# Patient Record
Sex: FEMALE | Race: BLACK OR AFRICAN AMERICAN | NOT HISPANIC OR LATINO | Employment: OTHER | URBAN - METROPOLITAN AREA
[De-identification: names, ages, dates, MRNs, and addresses within clinical notes are randomized per-mention and may not be internally consistent; named-entity substitution may affect disease eponyms.]

---

## 2018-08-16 ENCOUNTER — HISTORICAL (OUTPATIENT)
Dept: ADMINISTRATIVE | Facility: HOSPITAL | Age: 83
End: 2018-08-16

## 2020-06-09 ENCOUNTER — HISTORICAL (OUTPATIENT)
Dept: ADMINISTRATIVE | Facility: HOSPITAL | Age: 85
End: 2020-06-09

## 2022-10-10 ENCOUNTER — TELEPHONE (OUTPATIENT)
Dept: INTERNAL MEDICINE | Facility: CLINIC | Age: 87
End: 2022-10-10
Payer: MEDICARE

## 2023-02-06 ENCOUNTER — HOSPITAL ENCOUNTER (EMERGENCY)
Facility: HOSPITAL | Age: 88
Discharge: HOME | End: 2023-02-06
Attending: EMERGENCY MEDICINE
Payer: MEDICARE

## 2023-02-06 VITALS
TEMPERATURE: 97 F | OXYGEN SATURATION: 98 % | RESPIRATION RATE: 16 BRPM | HEIGHT: 64 IN | SYSTOLIC BLOOD PRESSURE: 131 MMHG | WEIGHT: 162.56 LBS | HEART RATE: 84 BPM | BODY MASS INDEX: 27.75 KG/M2 | DIASTOLIC BLOOD PRESSURE: 69 MMHG

## 2023-02-06 DIAGNOSIS — S42.201A CLOSED FRACTURE OF PROXIMAL END OF RIGHT HUMERUS, UNSPECIFIED FRACTURE MORPHOLOGY, INITIAL ENCOUNTER: ICD-10-CM
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
## 2023-02-06 PROCEDURE — 99284 EMERGENCY DEPT VISIT MOD MDM: CPT | Mod: 25

## 2023-02-06 PROCEDURE — 25000003 PHARM REV CODE 250: Performed by: EMERGENCY MEDICINE

## 2023-02-06 RX ORDER — HYDROCODONE BITARTRATE AND ACETAMINOPHEN 10; 325 MG/1; MG/1
1 TABLET ORAL EVERY 6 HOURS PRN
Qty: 20 TABLET | Refills: 0 | Status: SHIPPED | OUTPATIENT
Start: 2023-02-06 | End: 2023-02-11

## 2023-02-06 RX ORDER — HYDROCODONE BITARTRATE AND ACETAMINOPHEN 5; 325 MG/1; MG/1
1 TABLET ORAL
Status: COMPLETED | OUTPATIENT
Start: 2023-02-06 | End: 2023-02-06

## 2023-02-06 RX ADMIN — HYDROCODONE BITARTRATE AND ACETAMINOPHEN 1 TABLET: 5; 325 TABLET ORAL at 09:02

## 2023-02-07 NOTE — ED PROVIDER NOTES
Encounter Date: 2/6/2023       History     Chief Complaint   Patient presents with    Arm Injury     Fell this evening hurting her rt arm.      Mrs. Paula Nicole is a 89 yo female who presents with chief complaint fall. Onset was just prior to arrival when she had missed a step going into the living room and fell landing on her right side, denies having any loss of consciousness. She reports having constant moderate to severe pain in her right upper arm that is aggravated with movement and improved with rest. She also reports having some pain to her right hip that is aggravated with movement and weightbearing, this is mild to moderate in severity. Denies neck pain, back pain, numbness or tingling in extremities, changes in vision or hearing, trouble with speech or swallowing.    The history is provided by the patient.   Review of patient's allergies indicates:  No Known Allergies  Past Medical History:   Diagnosis Date    Unspecified cataract      Past Surgical History:   Procedure Laterality Date    CATARACT EXTRACTION      TONSILLECTOMY       No family history on file.     Review of Systems    Physical Exam     Initial Vitals [02/06/23 2047]   BP Pulse Resp Temp SpO2   (!) 157/69 75 18 96.6 °F (35.9 °C) 99 %      MAP       --         Physical Exam    Nursing note and vitals reviewed.  Constitutional: Vital signs are normal. She appears well-developed and well-nourished.   HENT:   Head: Normocephalic and atraumatic.   Mouth/Throat: Uvula is midline and mucous membranes are normal.   Eyes: EOM are normal. Pupils are equal, round, and reactive to light.   Neck: Trachea normal. Neck supple.   Normal range of motion.  Cardiovascular:  Normal rate, regular rhythm, intact distal pulses and normal pulses.           Pulmonary/Chest: Effort normal and breath sounds normal.   Abdominal: Abdomen is soft. Bowel sounds are normal. There is no rebound and no guarding.   Musculoskeletal:         General: Tenderness present.       Right upper arm: Tenderness and bony tenderness present.      Left upper arm: Normal.      Right elbow: Normal.      Left elbow: Normal.      Right forearm: Normal. No deformity, tenderness or bony tenderness.      Left forearm: Normal.      Right wrist: Normal.      Left wrist: Normal.      Right hand: Normal.      Left hand: Normal.      Cervical back: Normal, normal range of motion and neck supple. No bony tenderness.      Thoracic back: Normal. No bony tenderness.      Lumbar back: Normal. No bony tenderness.      Right hip: Tenderness present.      Left hip: Normal.      Right upper leg: Normal.      Left upper leg: Normal.      Right knee: Normal.      Left knee: Normal.      Right lower leg: Normal.      Left lower leg: Normal.      Right ankle: Normal.      Left ankle: Normal.      Right foot: Normal.      Left foot: Normal.     Neurological: She is alert and oriented to person, place, and time. GCS score is 15. GCS eye subscore is 4. GCS verbal subscore is 5. GCS motor subscore is 6.   Skin: Skin is warm and dry. Capillary refill takes less than 2 seconds.   Psychiatric: She has a normal mood and affect. Her speech is normal. Thought content normal.       ED Course   Procedures  Labs Reviewed - No data to display       Imaging Results              CT Head Without Contrast (Final result)  Result time 02/06/23 21:57:43      Final result by Julian Tran MD (02/06/23 21:57:43)                   Impression:      No acute intracranial abnormality identified.  Findings of chronic microvascular ischemic disease.      Electronically signed by: Julian Tran  Date:    02/06/2023  Time:    21:57               Narrative:    EXAMINATION:  CT HEAD WITHOUT CONTRAST    CLINICAL HISTORY:  Head trauma, minor (Age >= 65y);    TECHNIQUE:  Low dose axial images were obtained through the head.  Coronal and sagittal reformations were also performed. Contrast was not administered.    Automatic exposure control was utilized  to reduce the patient's radiation dose.    DLP= 886    COMPARISON:  None.    FINDINGS:  No acute intracranial hemorrhage, edema or mass. No acute parenchymal abnormality.    Diffuse cerebral atrophy with concordant ventricular enlargement    Scattered hypodensities throughout the deep periventricular white matter.    The osseous structures are normal.    The mastoid air cells are clear.    The auditory canals are patent bilaterally.    The globes and orbital contents are normal bilaterally.    The visualized maxillary, ethmoid and sphenoid sinuses are clear.                                       CT Cervical Spine Without Contrast (Final result)  Result time 02/06/23 21:59:13      Final result by Julian Tran MD (02/06/23 21:59:13)                   Impression:      1. No acute cervical spine abnormality identified.    2. Ligament, spinal cord and/or vascular abnormalities cannot be excluded on the basis of this examination.    Degenerative changes of the cervical spine.  The thyroid is markedly heterogeneous throughout.  Recommend further evaluation with nonemergent ultrasound.      Electronically signed by: Julian Tran  Date:    02/06/2023  Time:    21:59               Narrative:    EXAMINATION:  CT CERVICAL SPINE WITHOUT CONTRAST    CLINICAL HISTORY:  Neck trauma (Age >= 65y);    TECHNIQUE:  CT of the cervical spine Without contrast. Sagittal and coronal reconstructions were performed on the source images.    Automatic exposure control was utilized to reduce the patient's radiation dose.    DLP = 324    COMPARISON:  No prior imaging available for comparison.    FINDINGS:  There is no acute fracture, subluxation, or dislocation. Limited detail regarding cervical discs, but there is no finding seen to suggest acute disc herniation.  Loss of disc space greatest at C5-C6 and C6-C7.  There is mild to moderate bilateral neural foraminal narrowing at these levels.  The lateral masses are symmetric about the dens.  Straightening of the normal lordotic curvature.    The prevertebral soft tissues are normal. There is no lymphadenopathy.  The thyroid is markedly heterogeneous throughout.                                       X-Ray Hip 2 or 3 views Right (with Pelvis when performed) (Final result)  Result time 02/06/23 21:44:26      Final result by Julian Tran MD (02/06/23 21:44:26)                   Impression:      No displaced fracture appreciated.  If continued pain recommend repeat imaging within 2 weeks.  Nondisplaced fracture may remain occult.      Electronically signed by: Julian Tran  Date:    02/06/2023  Time:    21:44               Narrative:    EXAMINATION:  XR HIP WITH PELVIS WHEN PERFORMED, 2 OR 3  VIEWS RIGHT    CLINICAL HISTORY:  fall;    TECHNIQUE:  Single view of the pelvis with two views of the right hip.    COMPARISON:  No prior imaging available for comparison    FINDINGS:  The pubic symphysis is congruent.  The sacroiliac joints are symmetric.  Degenerative changes with bilateral acetabular osteophytes.  No displaced fracture.                                       X-Ray Humerus 2 View Right (Final result)  Result time 02/06/23 21:43:19      Final result by Julian Tran MD (02/06/23 21:43:19)                   Impression:      Comminuted fracture of the proximal humerus with mild impaction.      Electronically signed by: Julian Tran  Date:    02/06/2023  Time:    21:43               Narrative:    EXAMINATION:  XR HUMERUS 2 VIEW RIGHT    CLINICAL HISTORY:  Unspecified fall, initial encounter    TECHNIQUE:  Two views of the right humerus.    COMPARISON:  None    FINDINGS:  Comminuted fracture of the proximal humerus with mild impaction.                                       Medications   HYDROcodone-acetaminophen 5-325 mg per tablet 1 tablet (1 tablet Oral Given 2/6/23 2150)     Medical Decision Making:   Initial Assessment:   89 yo female presents after fall from 2 steps in living room landing  on her right side, denies loss of consciousness. She was placed in sling upon ED arrival. Will get imaging of right humerus and hip to identify any fractures. Due to age will also get CT head and since she has a distracting injury I am unable to clinically clear her cervical spine so will get CT c-spine as well. Will treat pain with norco and reassess.    CT head shows no acute intracranial process.  CT C-spine shows no acute bony abnormality.  X-ray of the right hip shows no evidence of fracture or dislocation.  X-ray of the right humerus shows a comminuted fracture of the proximal humerus with mild impaction.  She is neurovascularly intact distally throughout the arm with full range of motion of wrist and fingers.  All imaging discussed with the patient and family.  Instructed to stay in her sling and follow up outpatient with Orthopedics for which I provided a referral.  I counseled patient on risks associated with opioid medication including, but not limited to, physical dependence and/or addiction, confusion, constipation, drowsiness, nausea or vomiting, impairment in driving and/or operating machinery. I also advised the patient that taking more opioids than prescribed or mixing with other central nervous system depressants/sedatives, such as benzodiazepines or alcohol, can result in respiratory depression, hypoxic brain injury, coma, or death.  I have spoken with the patient and/or caregivers. I have explained the patient's condition, diagnoses and treatment plan based on the information available to me at this time. I have answered the patient's and/or caregiver's questions and addressed any concerns. The patient and/or caregivers have as good an understanding of the patient's diagnosis, condition and treatment plan as can be expected at this point. The vital signs have been stable. The patient's condition is stable and appropriate for discharge from the emergency department.     The patient will pursue  further outpatient evaluation with the primary care physician or other designated or consulting physician as outlined in the discharge instructions. The patient and/or caregivers are agreeable to this plan of care and follow-up instructions have been explained in detail. The patient and/or caregivers have received these instructions in written format and have expressed an understanding of the discharge instructions. The patient and/or caregivers are aware that any significant change in condition or worsening of symptoms should prompt an immediate return to this or the closest emergency department or a call to 911.  Clinical Tests:   Radiological Study: Ordered and Reviewed                        Clinical Impression:   Final diagnoses:  [W19.XXXA] Fall  [W19.XXXA] Fall, initial encounter (Primary)  [S42.201A] Closed fracture of proximal end of right humerus, unspecified fracture morphology, initial encounter        ED Disposition Condition    Discharge Stable          ED Prescriptions       Medication Sig Dispense Start Date End Date Auth. Provider    HYDROcodone-acetaminophen (NORCO)  mg per tablet Take 1 tablet by mouth every 6 (six) hours as needed for Pain. 20 tablet 2/6/2023 2/11/2023 Elmo Rosenthal DO          Follow-up Information    None          Elmo Rosenthal DO  02/07/23 0259

## 2023-02-08 ENCOUNTER — OFFICE VISIT (OUTPATIENT)
Dept: ORTHOPEDICS | Facility: CLINIC | Age: 88
End: 2023-02-08
Payer: MEDICARE

## 2023-02-08 DIAGNOSIS — M25.559 PAIN IN UNSPECIFIED HIP: ICD-10-CM

## 2023-02-08 DIAGNOSIS — S42.201A CLOSED FRACTURE OF PROXIMAL END OF RIGHT HUMERUS, UNSPECIFIED FRACTURE MORPHOLOGY, INITIAL ENCOUNTER: Primary | ICD-10-CM

## 2023-02-08 PROCEDURE — 99203 OFFICE O/P NEW LOW 30 MIN: CPT | Mod: ,,, | Performed by: REHABILITATION UNIT

## 2023-02-08 PROCEDURE — 3288F PR FALLS RISK ASSESSMENT DOCUMENTED: ICD-10-PCS | Mod: CPTII,,, | Performed by: REHABILITATION UNIT

## 2023-02-08 PROCEDURE — 1125F PR PAIN SEVERITY QUANTIFIED, PAIN PRESENT: ICD-10-PCS | Mod: CPTII,,, | Performed by: REHABILITATION UNIT

## 2023-02-08 PROCEDURE — 1125F AMNT PAIN NOTED PAIN PRSNT: CPT | Mod: CPTII,,, | Performed by: REHABILITATION UNIT

## 2023-02-08 PROCEDURE — 1100F PTFALLS ASSESS-DOCD GE2>/YR: CPT | Mod: CPTII,,, | Performed by: REHABILITATION UNIT

## 2023-02-08 PROCEDURE — 1100F PR PT FALLS ASSESS DOC 2+ FALLS/FALL W/INJURY/YR: ICD-10-PCS | Mod: CPTII,,, | Performed by: REHABILITATION UNIT

## 2023-02-08 PROCEDURE — 3288F FALL RISK ASSESSMENT DOCD: CPT | Mod: CPTII,,, | Performed by: REHABILITATION UNIT

## 2023-02-08 PROCEDURE — 99203 PR OFFICE/OUTPT VISIT, NEW, LEVL III, 30-44 MIN: ICD-10-PCS | Mod: ,,, | Performed by: REHABILITATION UNIT

## 2023-02-08 RX ORDER — DOCUSATE SODIUM 100 MG/1
100 CAPSULE, LIQUID FILLED ORAL 2 TIMES DAILY
COMMUNITY

## 2023-02-08 RX ORDER — PNV NO.95/FERROUS FUM/FOLIC AC 28MG-0.8MG
100 TABLET ORAL DAILY
COMMUNITY

## 2023-02-08 RX ORDER — AMOXICILLIN 500 MG
CAPSULE ORAL DAILY
COMMUNITY

## 2023-02-08 RX ORDER — AMLODIPINE BESYLATE 5 MG/1
5 TABLET ORAL
COMMUNITY
Start: 2023-01-24

## 2023-02-08 RX ORDER — ASPIRIN 81 MG/1
81 TABLET ORAL DAILY
COMMUNITY

## 2023-02-08 RX ORDER — IRBESARTAN 300 MG/1
300 TABLET ORAL
COMMUNITY
Start: 2023-01-24

## 2023-02-08 NOTE — PROGRESS NOTES
Subjective:      Patient ID: Paula Nicole is a 89 y.o. female.    Chief Complaint: Pain of the Right Upper Arm and Follow-up (R. HUMERUS FX - WENT OT [facility] - ER. Patient sy=chang she also has pain in her right hip. States the ER doctor told her if it got worse to get a CT scan done. pain meds are not working and makes patient vomit. would like to discuss changing her medication. pain is a 10/10. )    HPI:   Paula Nicole is a 89 y.o. female who presents today for initial evaluation of her right shoulder.  She reports that she tripped on a step and fell on Monday.  She is right-hand dominant.  She did not use any assistive device to help with ambulation prior to this fall.  She went to an outside emergency department where radiographs showed a fracture.  She is placed into a sling and swath.  She has tried some narcotic medicine but does not tolerate this well due to nausea and vomiting.  She has persistent pain about the right upper arm.  No sensory or motor deficits are reported distally.  She did have some shoulder pain prior to this and her baseline function did not have full motion.  Patient also has some right hip pain after the fall.  No preceding pain.  Pain is localized to the groin.  She is in a wheelchair.    Past Medical History:   Diagnosis Date    Unspecified cataract      Past Surgical History:   Procedure Laterality Date    CATARACT EXTRACTION      TONSILLECTOMY       Social History     Socioeconomic History    Marital status:    Tobacco Use    Smoking status: Never    Smokeless tobacco: Never   Substance and Sexual Activity    Alcohol use: Never    Drug use: Never    Sexual activity: Never         Current Outpatient Medications:     amLODIPine (NORVASC) 5 MG tablet, Take 5 mg by mouth., Disp: , Rfl:     aspirin (ECOTRIN) 81 MG EC tablet, Take 81 mg by mouth once daily., Disp: , Rfl:     cyanocobalamin (VITAMIN B-12) 100 MCG tablet, Take 100 mcg by mouth once daily., Disp: , Rfl:     docusate sodium  (COLACE) 100 MG capsule, Take 100 mg by mouth 2 (two) times daily., Disp: , Rfl:     gabapentin (NEURONTIN) 300 MG capsule, Take 300 mg by mouth 2 (two) times a day., Disp: , Rfl:     irbesartan (AVAPRO) 300 MG tablet, Take 300 mg by mouth., Disp: , Rfl:     multivitamin with minerals tablet, Take 1 tablet by mouth once daily., Disp: , Rfl:     omega-3 fatty acids/fish oil (FISH OIL-OMEGA-3 FATTY ACIDS) 300-1,000 mg capsule, Take by mouth once daily., Disp: , Rfl:     HYDROcodone-acetaminophen (NORCO) 7.5-325 mg per tablet, , Disp: , Rfl:   Review of patient's allergies indicates:  No Known Allergies    There were no vitals taken for this visit.    Comprehensive review of systems completed and negative except as per HPI.        Objective:   Head: Normocephalic, without obvious abnormality, atraumatic  Eyes: conjunctivae/corneas clear. EOM's intact  Ears: normal external appearance  Nose: Nares normal. Septum midline. Mucosa normal. No drainage  Throat: normal findings: lips normal without lesions  Lungs: unlabored breathing on room air  Chest wall: symmetric chest rise  Heart: regular rate and rhythm  Pulses: 2+ and symmetric  Skin: Skin color, texture, turgor normal. No rashes or lesions  Neurologic: Grossly normal    right SHOULDER    Appearance:   Moderate soft tissue swelling and some ecchymosis about the upper arm    Cervical Spine:   No ttp; full, painless ROM    Tenderness:   Diffusely about the shoulder    AROM:   Deferred    PROM:  Deferred    Pain:  With movement    Strength:  Deferred    Provocative Maneuvers:     Rotator Cuff/Biceps/AC Joint  Deferred    Pulses: Palpable radial pulse    Neurological deficits: None    The patient has a warm and well-perfused upper extremity with capillary refill less than 2 seconds. Sensation is intact to light touch in terminal nerve distributions. 5/5 ain/pin/uln. The patient has no palpable epitrochlear lymphadenopathy.    Right lower extremity  She is neurovascularly  intact distally.  She does have some tenderness about her hip.  She is been unable to ambulate and is in a wheelchair.  She has pain with range of motion of the hip.  A lot of pain laterally.    Assessment:     Imaging:   Narrative & Impression  EXAMINATION:  XR HUMERUS 2 VIEW RIGHT     CLINICAL HISTORY:  Unspecified fall, initial encounter     TECHNIQUE:  Two views of the right humerus.     COMPARISON:  None     FINDINGS:  Comminuted fracture of the proximal humerus with mild impaction.     Impression:     Comminuted fracture of the proximal humerus with mild impaction.        Electronically signed by: Julian Tran  Date:                                            02/06/2023  Time:                                           21:43    Narrative & Impression  EXAMINATION:  XR HIP WITH PELVIS WHEN PERFORMED, 2 OR 3  VIEWS RIGHT     CLINICAL HISTORY:  fall;     TECHNIQUE:  Single view of the pelvis with two views of the right hip.     COMPARISON:  No prior imaging available for comparison     FINDINGS:  The pubic symphysis is congruent.  The sacroiliac joints are symmetric.  Degenerative changes with bilateral acetabular osteophytes.  No displaced fracture.     Impression:     No displaced fracture appreciated.  If continued pain recommend repeat imaging within 2 weeks.  Nondisplaced fracture may remain occult.        Electronically signed by: Julian Tran  Date:                                            02/06/2023  Time:                                           21:44    Imaging of the hip and humerus reviewed.  Proximal humerus fracture.  No hip abnormalities.        1. Closed fracture of proximal end of right humerus, unspecified fracture morphology, initial encounter    2. Pain in unspecified hip          Plan:       Orders Placed This Encounter    CT Hip Without Contrast Right      Imaging and exam findings discussed with the patient and her family.  With regard to her right shoulder she has a comminuted and  displaced right proximal humerus fracture.  Given her age and functional status we will proceed with nonoperative management.  Her left shoulder function is painless with forward flexion limited to 90°.  She will continue sling immobilization of the upper extremity.  Control.  With regard to her right hip.  No acute abnormality appreciated on radiographs.  We will send her for CT for further evaluation for any bony injury.  I will see her back after the CT is completed to discuss results and treatment plan.  All of her questions were answered and she was in agreement.

## 2023-02-16 ENCOUNTER — HOSPITAL ENCOUNTER (OUTPATIENT)
Dept: RADIOLOGY | Facility: CLINIC | Age: 88
Discharge: HOME | End: 2023-02-16
Attending: PHYSICIAN ASSISTANT
Payer: MEDICARE

## 2023-02-16 ENCOUNTER — OFFICE VISIT (OUTPATIENT)
Dept: ORTHOPEDICS | Facility: CLINIC | Age: 88
End: 2023-02-16
Payer: MEDICARE

## 2023-02-16 VITALS
WEIGHT: 162 LBS | HEIGHT: 64 IN | BODY MASS INDEX: 27.66 KG/M2 | DIASTOLIC BLOOD PRESSURE: 58 MMHG | HEART RATE: 80 BPM | SYSTOLIC BLOOD PRESSURE: 131 MMHG

## 2023-02-16 DIAGNOSIS — S42.291D OTHER CLOSED DISPLACED FRACTURE OF PROXIMAL END OF RIGHT HUMERUS WITH ROUTINE HEALING, SUBSEQUENT ENCOUNTER: ICD-10-CM

## 2023-02-16 DIAGNOSIS — M25.511 RIGHT SHOULDER PAIN, UNSPECIFIED CHRONICITY: ICD-10-CM

## 2023-02-16 DIAGNOSIS — M25.559 PAIN IN UNSPECIFIED HIP: Primary | ICD-10-CM

## 2023-02-16 PROCEDURE — 3288F FALL RISK ASSESSMENT DOCD: CPT | Mod: CPTII,,, | Performed by: PHYSICIAN ASSISTANT

## 2023-02-16 PROCEDURE — 1159F MED LIST DOCD IN RCRD: CPT | Mod: CPTII,,, | Performed by: PHYSICIAN ASSISTANT

## 2023-02-16 PROCEDURE — 1160F RVW MEDS BY RX/DR IN RCRD: CPT | Mod: CPTII,,, | Performed by: PHYSICIAN ASSISTANT

## 2023-02-16 PROCEDURE — 1159F PR MEDICATION LIST DOCUMENTED IN MEDICAL RECORD: ICD-10-PCS | Mod: CPTII,,, | Performed by: PHYSICIAN ASSISTANT

## 2023-02-16 PROCEDURE — 1101F PT FALLS ASSESS-DOCD LE1/YR: CPT | Mod: CPTII,,, | Performed by: PHYSICIAN ASSISTANT

## 2023-02-16 PROCEDURE — 3288F PR FALLS RISK ASSESSMENT DOCUMENTED: ICD-10-PCS | Mod: CPTII,,, | Performed by: PHYSICIAN ASSISTANT

## 2023-02-16 PROCEDURE — 1125F PR PAIN SEVERITY QUANTIFIED, PAIN PRESENT: ICD-10-PCS | Mod: CPTII,,, | Performed by: PHYSICIAN ASSISTANT

## 2023-02-16 PROCEDURE — 99214 OFFICE O/P EST MOD 30 MIN: CPT | Mod: ,,, | Performed by: PHYSICIAN ASSISTANT

## 2023-02-16 PROCEDURE — 99214 PR OFFICE/OUTPT VISIT, EST, LEVL IV, 30-39 MIN: ICD-10-PCS | Mod: ,,, | Performed by: PHYSICIAN ASSISTANT

## 2023-02-16 PROCEDURE — 1160F PR REVIEW ALL MEDS BY PRESCRIBER/CLIN PHARMACIST DOCUMENTED: ICD-10-PCS | Mod: CPTII,,, | Performed by: PHYSICIAN ASSISTANT

## 2023-02-16 PROCEDURE — 73060 X-RAY EXAM OF HUMERUS: CPT | Mod: RT,,, | Performed by: PHYSICIAN ASSISTANT

## 2023-02-16 PROCEDURE — 73060 XR HUMERUS 2 VIEW RIGHT: ICD-10-PCS | Mod: RT,,, | Performed by: PHYSICIAN ASSISTANT

## 2023-02-16 PROCEDURE — 1125F AMNT PAIN NOTED PAIN PRSNT: CPT | Mod: CPTII,,, | Performed by: PHYSICIAN ASSISTANT

## 2023-02-16 PROCEDURE — 1101F PR PT FALLS ASSESS DOC 0-1 FALLS W/OUT INJ PAST YR: ICD-10-PCS | Mod: CPTII,,, | Performed by: PHYSICIAN ASSISTANT

## 2023-02-16 RX ORDER — HYDROCODONE BITARTRATE AND ACETAMINOPHEN 7.5; 325 MG/1; MG/1
TABLET ORAL
COMMUNITY
Start: 2023-02-08

## 2023-02-16 NOTE — PROGRESS NOTES
"Chief Complaint:   Chief Complaint   Patient presents with    Right Hip - Results     CT results rt hip. Pt states she is still having some pain.        History of present illness:    This is a 89 y.o. year old female who complains of follow up for a CT of the pelvis and along with a right proximal humerus fracture.    Patient is up ambulating with assistive device and she states her shoulder is still painful but she has been managing in wearing a sling for comfort    Review of Systems:    Constitution:   Denies chills, fever, and sweats.  HENT:   Denies headaches or blurry vision.  Cardiovascular:  Denies chest pain or irregular heart beat.  Respiratory:   Denies cough or shortness of breath.  Gastrointestinal:  Denies abdominal pain, nausea, or vomiting.  Musculoskeletal:   Denies muscle cramps.  Neurological:   Denies dizziness or focal weakness.  Psychiatric/Behavior: Normal mental status.  Hematology/Lymph:  Denies bleeding problem or easy bruising/bleeding.  Skin:    Denies rash or suspicious lesions.    Examination:    Vital Signs:    Vitals:    02/16/23 0905 02/16/23 0906   BP: (!) 131/58    Pulse: 80    Weight: 73.5 kg (162 lb)    Height: 5' 4" (1.626 m)    PainSc:    9       Body mass index is 27.81 kg/m².    Constitution:   Well-developed, well nourished patient in no acute distress.  Neurological:   Alert and oriented x 3 and cooperative to examination.     Psychiatric/Behavior: Normal mental status.  Respiratory:   No shortness of breath.  Eyes:    Extraoccular muscles intact  Skin:    No scars, rash or suspicious lesions.    Physical Exam:   Right hip exam   Patient has no pain with flexion of the hip.    She has no pain with internal or external rotation with the hip flexed  She is ambulating with a assistive device and a mildly altered gait but this is not different from normal gait pattern for her family.      Right shoulder exam   Patient has tense palpation of the proximal humerus although it is " minimal.    She does have some ecchymosis and some mild edema about the right shoulder and upper arm.    She is intact motor and sensation of the right upper extremity   She has good range of motion of the elbow wrist and fingers.  Line she is limited range of motion of the shoulder due to pain but she is working on some pendulum    Imaging: X-rays ordered and images interpreted today personally by me of right shoulder three views   Patient has an impacted and mildly displaced proximal humerus fracture although the glenohumeral joint is lined up well.        CT exam of the right pelvis   There is no fracture, dislocation or destructive osseous lesion.  The right femoral head and neck are intact.  There is no osteonecrosis.  Mineralization is normal.  The right pelvic ring is intact.  The acetabulum is unremarkable.  The sacrum is intact.     Muscle bulk and attenuation is well preserved.  There is no free fluid.  The ischiorectal fossa is normal.  No obvious joint effusion.     Impression:     No acute fracture or dislocation.         Assessment: There are no diagnoses linked to this encounter.     Plan:  At this point the patient will weightbear as tolerated with assistive device.      She will follow-up in 3-4 weeks for repeat x-ray of her shoulder at that time the fracture remained stable we will start some formal physical therapy.      She will continue work on her pendulum exercises in the meantime work sling for comfort          DISCLAIMER: This note may have been dictated using voice recognition software and may contain grammatical errors.     NOTE: Consult report sent to referring provider via [employer].

## 2023-03-06 ENCOUNTER — OFFICE VISIT (OUTPATIENT)
Dept: ORTHOPEDICS | Facility: CLINIC | Age: 88
End: 2023-03-06
Payer: MEDICARE

## 2023-03-06 ENCOUNTER — HOSPITAL ENCOUNTER (OUTPATIENT)
Dept: RADIOLOGY | Facility: CLINIC | Age: 88
Discharge: HOME | End: 2023-03-06
Attending: REHABILITATION UNIT
Payer: MEDICARE

## 2023-03-06 VITALS
DIASTOLIC BLOOD PRESSURE: 79 MMHG | SYSTOLIC BLOOD PRESSURE: 138 MMHG | HEART RATE: 67 BPM | BODY MASS INDEX: 27.66 KG/M2 | WEIGHT: 162 LBS | HEIGHT: 64 IN

## 2023-03-06 DIAGNOSIS — M25.511 RIGHT SHOULDER PAIN, UNSPECIFIED CHRONICITY: ICD-10-CM

## 2023-03-06 DIAGNOSIS — S42.291D OTHER CLOSED DISPLACED FRACTURE OF PROXIMAL END OF RIGHT HUMERUS WITH ROUTINE HEALING, SUBSEQUENT ENCOUNTER: Primary | ICD-10-CM

## 2023-03-06 PROCEDURE — 1101F PT FALLS ASSESS-DOCD LE1/YR: CPT | Mod: CPTII,,, | Performed by: REHABILITATION UNIT

## 2023-03-06 PROCEDURE — 3288F FALL RISK ASSESSMENT DOCD: CPT | Mod: CPTII,,, | Performed by: REHABILITATION UNIT

## 2023-03-06 PROCEDURE — 99213 PR OFFICE/OUTPT VISIT, EST, LEVL III, 20-29 MIN: ICD-10-PCS | Mod: ,,, | Performed by: REHABILITATION UNIT

## 2023-03-06 PROCEDURE — 1101F PR PT FALLS ASSESS DOC 0-1 FALLS W/OUT INJ PAST YR: ICD-10-PCS | Mod: CPTII,,, | Performed by: REHABILITATION UNIT

## 2023-03-06 PROCEDURE — 99213 OFFICE O/P EST LOW 20 MIN: CPT | Mod: ,,, | Performed by: REHABILITATION UNIT

## 2023-03-06 PROCEDURE — 73030 XR SHOULDER COMPLETE 2 OR MORE VIEWS RIGHT: ICD-10-PCS | Mod: RT,,, | Performed by: REHABILITATION UNIT

## 2023-03-06 PROCEDURE — 3288F PR FALLS RISK ASSESSMENT DOCUMENTED: ICD-10-PCS | Mod: CPTII,,, | Performed by: REHABILITATION UNIT

## 2023-03-06 PROCEDURE — 73030 X-RAY EXAM OF SHOULDER: CPT | Mod: RT,,, | Performed by: REHABILITATION UNIT

## 2023-03-06 NOTE — PROGRESS NOTES
Subjective:      Patient ID: Paula Nicole is a 89 y.o. female.    Chief Complaint: Follow-up (rt shoulder states she has been doing a little better. she has been having soreness radiating down. denies numb/tingle down to the hands. ambulating with a sling. )    HPI:   Paula Nicole is a 89 y.o. female who presents today for follow up evaluation of her right shoulder and hip.  She reports that from a pain standpoint she is improving with regard to her right shoulder.  She does have some soreness.  The soreness is about her shoulder and radiates somewhat down her arm.  No sensory or motor changes distally.  She has been maintaining her sling.  She has worked some on pendulum exercises.  She has not started therapy.  Right hip pain is much improved.    Initial HPI:  She reports that she tripped on a step and fell on Monday.  She is right-hand dominant.  She did not use any assistive device to help with ambulation prior to this fall.  She went to an outside emergency department where radiographs showed a fracture.  She is placed into a sling and swath.  She has tried some narcotic medicine but does not tolerate this well due to nausea and vomiting.  She has persistent pain about the right upper arm.  No sensory or motor deficits are reported distally.  She did have some shoulder pain prior to this and her baseline function did not have full motion.  Patient also has some right hip pain after the fall.  No preceding pain.  Pain is localized to the groin.  She is in a wheelchair.    Past Medical History:   Diagnosis Date    Unspecified cataract      Past Surgical History:   Procedure Laterality Date    CATARACT EXTRACTION      TONSILLECTOMY       Social History     Socioeconomic History    Marital status:    Tobacco Use    Smoking status: Never    Smokeless tobacco: Never   Substance and Sexual Activity    Alcohol use: Never    Drug use: Never    Sexual activity: Never         Current Outpatient Medications:      "amLODIPine (NORVASC) 5 MG tablet, Take 5 mg by mouth., Disp: , Rfl:     aspirin (ECOTRIN) 81 MG EC tablet, Take 81 mg by mouth once daily., Disp: , Rfl:     cyanocobalamin (VITAMIN B-12) 100 MCG tablet, Take 100 mcg by mouth once daily., Disp: , Rfl:     docusate sodium (COLACE) 100 MG capsule, Take 100 mg by mouth 2 (two) times daily., Disp: , Rfl:     gabapentin (NEURONTIN) 300 MG capsule, Take 300 mg by mouth 2 (two) times a day., Disp: , Rfl:     HYDROcodone-acetaminophen (NORCO) 7.5-325 mg per tablet, , Disp: , Rfl:     irbesartan (AVAPRO) 300 MG tablet, Take 300 mg by mouth., Disp: , Rfl:     multivitamin with minerals tablet, Take 1 tablet by mouth once daily., Disp: , Rfl:     omega-3 fatty acids/fish oil (FISH OIL-OMEGA-3 FATTY ACIDS) 300-1,000 mg capsule, Take by mouth once daily., Disp: , Rfl:   Review of patient's allergies indicates:  No Known Allergies    /79   Pulse 67   Ht 5' 4" (1.626 m)   Wt 73.5 kg (162 lb)   BMI 27.81 kg/m²     Comprehensive review of systems completed and negative except as per HPI.        Objective:   Head: Normocephalic, without obvious abnormality, atraumatic  Eyes: conjunctivae/corneas clear. EOM's intact  Ears: normal external appearance  Nose: Nares normal. Septum midline. Mucosa normal. No drainage  Throat: normal findings: lips normal without lesions  Lungs: unlabored breathing on room air  Chest wall: symmetric chest rise  Heart: regular rate and rhythm  Pulses: 2+ and symmetric  Skin: Skin color, texture, turgor normal. No rashes or lesions  Neurologic: Grossly normal    right SHOULDER    Appearance:   Small soft tissue swelling about the upper arm    Cervical Spine:   No ttp; full, painless ROM    Tenderness:   Mildly about the shoulder    AROM:   Deferred    PROM:  Deferred    Pain:  Some with movement    Strength:  Deferred    Provocative Maneuvers:     Rotator Cuff/Biceps/AC Joint  Deferred    Pulses: Palpable radial pulse    Neurological deficits: " None    The patient has a warm and well-perfused upper extremity with capillary refill less than 2 seconds. Sensation is intact to light touch in terminal nerve distributions. 5/5 ain/pin/uln. The patient has no palpable epitrochlear lymphadenopathy.    Right lower extremity  She is neurovascularly intact distally.  She does have some tenderness about her hip.  She is been unable to ambulate and is in a wheelchair.  She has pain with range of motion of the hip.  A lot of pain laterally.    Assessment:     Imaging:   Narrative & Impression  EXAMINATION:  XR HUMERUS 2 VIEW RIGHT     CLINICAL HISTORY:  Unspecified fall, initial encounter     TECHNIQUE:  Two views of the right humerus.     COMPARISON:  None     FINDINGS:  Comminuted fracture of the proximal humerus with mild impaction.     Impression:     Comminuted fracture of the proximal humerus with mild impaction.        Electronically signed by: Julian Tran  Date:                                            02/06/2023  Time:                                           21:43    Narrative & Impression  EXAMINATION:  XR HIP WITH PELVIS WHEN PERFORMED, 2 OR 3  VIEWS RIGHT     CLINICAL HISTORY:  fall;     TECHNIQUE:  Single view of the pelvis with two views of the right hip.     COMPARISON:  No prior imaging available for comparison     FINDINGS:  The pubic symphysis is congruent.  The sacroiliac joints are symmetric.  Degenerative changes with bilateral acetabular osteophytes.  No displaced fracture.     Impression:     No displaced fracture appreciated.  If continued pain recommend repeat imaging within 2 weeks.  Nondisplaced fracture may remain occult.        Electronically signed by: Julian Tran  Date:                                            02/06/2023  Time:                                           21:44    Imaging of the hip and humerus reviewed.  Proximal humerus fracture.  No hip abnormalities.    Three-view radiographs of the right shoulder obtained  today show stable appearance of proximal humerus fracture with some slight interval healing        1. Other closed displaced fracture of proximal end of right humerus with routine healing, subsequent encounter    2. Right shoulder pain, unspecified chronicity          Plan:       Orders Placed This Encounter    X-ray Shoulder 2 or More Views Right    Ambulatory referral/consult to Physical/Occupational Therapy      Imaging and exam findings discussed with the patient and her family.  Clinically her right shoulder is improving with improved pain.  She has maintained her sling.  We will transition her to a simple sling and plan to start formal physical therapy next week.  I will see her back in 4 weeks for repeat radiographs of her shoulder and to check on her motion and strength.  All of her questions were answered and she was in agreement.

## 2023-04-03 ENCOUNTER — OFFICE VISIT (OUTPATIENT)
Dept: ORTHOPEDICS | Facility: CLINIC | Age: 88
End: 2023-04-03
Payer: MEDICARE

## 2023-04-03 ENCOUNTER — HOSPITAL ENCOUNTER (OUTPATIENT)
Dept: RADIOLOGY | Facility: CLINIC | Age: 88
Discharge: HOME | End: 2023-04-03
Attending: REHABILITATION UNIT
Payer: MEDICARE

## 2023-04-03 DIAGNOSIS — S42.291D OTHER CLOSED DISPLACED FRACTURE OF PROXIMAL END OF RIGHT HUMERUS WITH ROUTINE HEALING, SUBSEQUENT ENCOUNTER: Primary | ICD-10-CM

## 2023-04-03 DIAGNOSIS — S42.291D OTHER CLOSED DISPLACED FRACTURE OF PROXIMAL END OF RIGHT HUMERUS WITH ROUTINE HEALING, SUBSEQUENT ENCOUNTER: ICD-10-CM

## 2023-04-03 PROCEDURE — 1159F MED LIST DOCD IN RCRD: CPT | Mod: CPTII,,, | Performed by: REHABILITATION UNIT

## 2023-04-03 PROCEDURE — 3288F PR FALLS RISK ASSESSMENT DOCUMENTED: ICD-10-PCS | Mod: CPTII,,, | Performed by: REHABILITATION UNIT

## 2023-04-03 PROCEDURE — 73030 X-RAY EXAM OF SHOULDER: CPT | Mod: RT,,, | Performed by: REHABILITATION UNIT

## 2023-04-03 PROCEDURE — 99213 OFFICE O/P EST LOW 20 MIN: CPT | Mod: ,,, | Performed by: REHABILITATION UNIT

## 2023-04-03 PROCEDURE — 73030 XR SHOULDER COMPLETE 2 OR MORE VIEWS RIGHT: ICD-10-PCS | Mod: RT,,, | Performed by: REHABILITATION UNIT

## 2023-04-03 PROCEDURE — 3288F FALL RISK ASSESSMENT DOCD: CPT | Mod: CPTII,,, | Performed by: REHABILITATION UNIT

## 2023-04-03 PROCEDURE — 1101F PR PT FALLS ASSESS DOC 0-1 FALLS W/OUT INJ PAST YR: ICD-10-PCS | Mod: CPTII,,, | Performed by: REHABILITATION UNIT

## 2023-04-03 PROCEDURE — 99213 PR OFFICE/OUTPT VISIT, EST, LEVL III, 20-29 MIN: ICD-10-PCS | Mod: ,,, | Performed by: REHABILITATION UNIT

## 2023-04-03 PROCEDURE — 1159F PR MEDICATION LIST DOCUMENTED IN MEDICAL RECORD: ICD-10-PCS | Mod: CPTII,,, | Performed by: REHABILITATION UNIT

## 2023-04-03 PROCEDURE — 1101F PT FALLS ASSESS-DOCD LE1/YR: CPT | Mod: CPTII,,, | Performed by: REHABILITATION UNIT

## 2023-04-03 NOTE — PROGRESS NOTES
Subjective:      Patient ID: Paula Nicole is a 89 y.o. female.    Chief Complaint: Pain of the Right Shoulder, Follow-up of the Right Hip, and Follow-up (F/U Right shoulder pain is still present has some numbness and tingling in her fingers, goes to PT 2x a week)    HPI:   Paula Nicole is a 89 y.o. female who presents today for follow up evaluation of her right shoulder.  She is persistent soreness about her shoulder.  She is been attending physical therapy with formal treatment try some week and is also working on some exercises on her own.  She still has her sling with her.  She is been wearing this a lot.  Reports some tingling into her small finger.  No motor changes.  Motion is improving.    Initial HPI:  She reports that she tripped on a step and fell on Monday.  She is right-hand dominant.  She did not use any assistive device to help with ambulation prior to this fall.  She went to an outside emergency department where radiographs showed a fracture.  She is placed into a sling and swath.  She has tried some narcotic medicine but does not tolerate this well due to nausea and vomiting.  She has persistent pain about the right upper arm.  No sensory or motor deficits are reported distally.  She did have some shoulder pain prior to this and her baseline function did not have full motion.  Patient also has some right hip pain after the fall.  No preceding pain.  Pain is localized to the groin.  She is in a wheelchair.    Past Medical History:   Diagnosis Date    Unspecified cataract      Past Surgical History:   Procedure Laterality Date    CATARACT EXTRACTION      TONSILLECTOMY       Social History     Socioeconomic History    Marital status:    Tobacco Use    Smoking status: Never    Smokeless tobacco: Never   Substance and Sexual Activity    Alcohol use: Never    Drug use: Never    Sexual activity: Never         Current Outpatient Medications:     amLODIPine (NORVASC) 5 MG tablet, Take 5 mg by mouth.,  Disp: , Rfl:     aspirin (ECOTRIN) 81 MG EC tablet, Take 81 mg by mouth once daily., Disp: , Rfl:     cyanocobalamin (VITAMIN B-12) 100 MCG tablet, Take 100 mcg by mouth once daily., Disp: , Rfl:     docusate sodium (COLACE) 100 MG capsule, Take 100 mg by mouth 2 (two) times daily., Disp: , Rfl:     gabapentin (NEURONTIN) 300 MG capsule, Take 300 mg by mouth 2 (two) times a day., Disp: , Rfl:     HYDROcodone-acetaminophen (NORCO) 7.5-325 mg per tablet, , Disp: , Rfl:     irbesartan (AVAPRO) 300 MG tablet, Take 300 mg by mouth., Disp: , Rfl:     multivitamin with minerals tablet, Take 1 tablet by mouth once daily., Disp: , Rfl:     omega-3 fatty acids/fish oil (FISH OIL-OMEGA-3 FATTY ACIDS) 300-1,000 mg capsule, Take by mouth once daily., Disp: , Rfl:   Review of patient's allergies indicates:  No Known Allergies    There were no vitals taken for this visit.    Comprehensive review of systems completed and negative except as per HPI.        Objective:   Head: Normocephalic, without obvious abnormality, atraumatic  Eyes: conjunctivae/corneas clear. EOM's intact  Ears: normal external appearance  Nose: Nares normal. Septum midline. Mucosa normal. No drainage  Throat: normal findings: lips normal without lesions  Lungs: unlabored breathing on room air  Chest wall: symmetric chest rise  Heart: regular rate and rhythm  Pulses: 2+ and symmetric  Skin: Skin color, texture, turgor normal. No rashes or lesions  Neurologic: Grossly normal    right SHOULDER    Appearance:   Skin is intact without lesion    Cervical Spine:   No ttp; full, painless ROM    Tenderness:   Some about the shoulder but continues to improve    AROM:   FF 70, Abd 60, ER 45    PROM:  Somewhat improved but limited by discomfort    Pain:  Some with movement    Strength:  improving    Provocative Maneuvers:     Rotator Cuff/Biceps/AC Joint  Deferred    Pulses: Palpable radial pulse    Neurological deficits: None    The patient has a warm and well-perfused  upper extremity with capillary refill less than 2 seconds. Sensation is intact to light touch in terminal nerve distributions. 5/5 ain/pin/uln. The patient has no palpable epitrochlear lymphadenopathy.    Assessment:     Imaging:   Narrative & Impression  EXAMINATION:  XR HUMERUS 2 VIEW RIGHT     CLINICAL HISTORY:  Unspecified fall, initial encounter     TECHNIQUE:  Two views of the right humerus.     COMPARISON:  None     FINDINGS:  Comminuted fracture of the proximal humerus with mild impaction.     Impression:     Comminuted fracture of the proximal humerus with mild impaction.        Electronically signed by: Julian Tran  Date:                                            02/06/2023  Time:                                           21:43    Narrative & Impression  EXAMINATION:  XR HIP WITH PELVIS WHEN PERFORMED, 2 OR 3  VIEWS RIGHT     CLINICAL HISTORY:  fall;     TECHNIQUE:  Single view of the pelvis with two views of the right hip.     COMPARISON:  No prior imaging available for comparison     FINDINGS:  The pubic symphysis is congruent.  The sacroiliac joints are symmetric.  Degenerative changes with bilateral acetabular osteophytes.  No displaced fracture.     Impression:     No displaced fracture appreciated.  If continued pain recommend repeat imaging within 2 weeks.  Nondisplaced fracture may remain occult.        Electronically signed by: Julian Tran  Date:                                            02/06/2023  Time:                                           21:44    Imaging of the hip and humerus reviewed.  Proximal humerus fracture.  No hip abnormalities.    Three-view radiographs of the right shoulder obtained today show stable appearance of proximal humerus fracture with interval healing.  Glenohumeral joint intact.        1. Other closed displaced fracture of proximal end of right humerus with routine healing, subsequent encounter          Plan:       Orders Placed This Encounter    X-ray  Shoulder 2 or More Views Right      Imaging and exam findings discussed with the patient and her family.  Right shoulder pain and function are improving.  Radiographs are stable.  Wean completely from the sling.  Continue therapy and home exercises.  Follow up in 6 weeks with repeat radiographs and motion and strength check. All of her questions were answered and she was in agreement.

## 2023-05-22 ENCOUNTER — OFFICE VISIT (OUTPATIENT)
Dept: ORTHOPEDICS | Facility: CLINIC | Age: 88
End: 2023-05-22
Payer: MEDICARE

## 2023-05-22 ENCOUNTER — HOSPITAL ENCOUNTER (OUTPATIENT)
Dept: RADIOLOGY | Facility: CLINIC | Age: 88
Discharge: HOME | End: 2023-05-22
Attending: REHABILITATION UNIT
Payer: MEDICARE

## 2023-05-22 VITALS — BODY MASS INDEX: 27.66 KG/M2 | WEIGHT: 162 LBS | HEIGHT: 64 IN

## 2023-05-22 DIAGNOSIS — S42.291D OTHER CLOSED DISPLACED FRACTURE OF PROXIMAL END OF RIGHT HUMERUS WITH ROUTINE HEALING, SUBSEQUENT ENCOUNTER: ICD-10-CM

## 2023-05-22 DIAGNOSIS — M25.511 RIGHT SHOULDER PAIN, UNSPECIFIED CHRONICITY: ICD-10-CM

## 2023-05-22 DIAGNOSIS — M25.511 RIGHT SHOULDER PAIN, UNSPECIFIED CHRONICITY: Primary | ICD-10-CM

## 2023-05-22 PROCEDURE — 1159F PR MEDICATION LIST DOCUMENTED IN MEDICAL RECORD: ICD-10-PCS | Mod: CPTII,,, | Performed by: REHABILITATION UNIT

## 2023-05-22 PROCEDURE — 99213 OFFICE O/P EST LOW 20 MIN: CPT | Mod: ,,, | Performed by: REHABILITATION UNIT

## 2023-05-22 PROCEDURE — 73030 XR SHOULDER COMPLETE 2 OR MORE VIEWS RIGHT: ICD-10-PCS | Mod: RT,,, | Performed by: REHABILITATION UNIT

## 2023-05-22 PROCEDURE — 1159F MED LIST DOCD IN RCRD: CPT | Mod: CPTII,,, | Performed by: REHABILITATION UNIT

## 2023-05-22 PROCEDURE — 73030 X-RAY EXAM OF SHOULDER: CPT | Mod: RT,,, | Performed by: REHABILITATION UNIT

## 2023-05-22 PROCEDURE — 99213 PR OFFICE/OUTPT VISIT, EST, LEVL III, 20-29 MIN: ICD-10-PCS | Mod: ,,, | Performed by: REHABILITATION UNIT

## 2023-05-22 NOTE — PROGRESS NOTES
Subjective:      Patient ID: Paula Nicole is a 89 y.o. female.    Chief Complaint: Follow-up (F/u for Rt humerus fx and rt hip pain, pt states hip is doing good, states as for the humerus states still has pain, states hand is not strong as well, still having issues, )    HPI:   Paula Nicole is a 89 y.o. female who presents today for follow up evaluation of her right shoulder.  She reports some improvement since her prior visit but still has some soreness and reduced motion.  She continues physical therapy and is seeing some slow progress.  She is been out of her sling.  Her tingling into her small finger is much improved.  No motor changes.  She is tolerating waist level activities but has persistent issues with higher level function.    Initial HPI:  She reports that she tripped on a step and fell on Monday.  She is right-hand dominant.  She did not use any assistive device to help with ambulation prior to this fall.  She went to an outside emergency department where radiographs showed a fracture.  She is placed into a sling and swath.  She has tried some narcotic medicine but does not tolerate this well due to nausea and vomiting.  She has persistent pain about the right upper arm.  No sensory or motor deficits are reported distally.  She did have some shoulder pain prior to this and her baseline function did not have full motion.  Patient also has some right hip pain after the fall.  No preceding pain.  Pain is localized to the groin.  She is in a wheelchair.    Past Medical History:   Diagnosis Date    Unspecified cataract      Past Surgical History:   Procedure Laterality Date    CATARACT EXTRACTION      TONSILLECTOMY       Social History     Socioeconomic History    Marital status:    Tobacco Use    Smoking status: Never    Smokeless tobacco: Never   Substance and Sexual Activity    Alcohol use: Never    Drug use: Never    Sexual activity: Never         Current Outpatient Medications:     amLODIPine  "(NORVASC) 5 MG tablet, Take 5 mg by mouth., Disp: , Rfl:     aspirin (ECOTRIN) 81 MG EC tablet, Take 81 mg by mouth once daily., Disp: , Rfl:     cyanocobalamin (VITAMIN B-12) 100 MCG tablet, Take 100 mcg by mouth once daily., Disp: , Rfl:     docusate sodium (COLACE) 100 MG capsule, Take 100 mg by mouth 2 (two) times daily., Disp: , Rfl:     gabapentin (NEURONTIN) 300 MG capsule, Take 300 mg by mouth 2 (two) times a day., Disp: , Rfl:     HYDROcodone-acetaminophen (NORCO) 7.5-325 mg per tablet, , Disp: , Rfl:     irbesartan (AVAPRO) 300 MG tablet, Take 300 mg by mouth., Disp: , Rfl:     multivitamin with minerals tablet, Take 1 tablet by mouth once daily., Disp: , Rfl:     omega-3 fatty acids/fish oil (FISH OIL-OMEGA-3 FATTY ACIDS) 300-1,000 mg capsule, Take by mouth once daily., Disp: , Rfl:   Review of patient's allergies indicates:  No Known Allergies    Ht 5' 4" (1.626 m)   Wt 73.5 kg (162 lb)   BMI 27.81 kg/m²     Comprehensive review of systems completed and negative except as per HPI.        Objective:   Head: Normocephalic, without obvious abnormality, atraumatic  Eyes: conjunctivae/corneas clear. EOM's intact  Ears: normal external appearance  Nose: Nares normal. Septum midline. Mucosa normal. No drainage  Throat: normal findings: lips normal without lesions  Lungs: unlabored breathing on room air  Chest wall: symmetric chest rise  Heart: regular rate and rhythm  Pulses: 2+ and symmetric  Skin: Skin color, texture, turgor normal. No rashes or lesions  Neurologic: Grossly normal    right SHOULDER    Appearance:   Skin is intact without lesion    Cervical Spine:   No ttp; full, painless ROM    Tenderness:   None discrete    AROM:   FF 80, Abd 70, ER 45    PROM:  Mildly improved    Pain:  Non appreciable    Strength:  improving    Provocative Maneuvers:     Rotator Cuff/Biceps/AC Joint  Deferred    Pulses: Palpable radial pulse    Neurological deficits: None    The patient has a warm and well-perfused upper " extremity with capillary refill less than 2 seconds. Sensation is intact to light touch in terminal nerve distributions. 5/5 ain/pin/uln. The patient has no palpable epitrochlear lymphadenopathy.    Assessment:     Imaging:   Narrative & Impression  EXAMINATION:  XR HUMERUS 2 VIEW RIGHT     CLINICAL HISTORY:  Unspecified fall, initial encounter     TECHNIQUE:  Two views of the right humerus.     COMPARISON:  None     FINDINGS:  Comminuted fracture of the proximal humerus with mild impaction.     Impression:     Comminuted fracture of the proximal humerus with mild impaction.        Electronically signed by: Julian Tran  Date:                                            02/06/2023  Time:                                           21:43    Narrative & Impression  EXAMINATION:  XR HIP WITH PELVIS WHEN PERFORMED, 2 OR 3  VIEWS RIGHT     CLINICAL HISTORY:  fall;     TECHNIQUE:  Single view of the pelvis with two views of the right hip.     COMPARISON:  No prior imaging available for comparison     FINDINGS:  The pubic symphysis is congruent.  The sacroiliac joints are symmetric.  Degenerative changes with bilateral acetabular osteophytes.  No displaced fracture.     Impression:     No displaced fracture appreciated.  If continued pain recommend repeat imaging within 2 weeks.  Nondisplaced fracture may remain occult.        Electronically signed by: Julian Tran  Date:                                            02/06/2023  Time:                                           21:44    Imaging of the hip and humerus reviewed.  Proximal humerus fracture.  No hip abnormalities.    Three-view radiographs of the right shoulder obtained today show stable appearance of displaced proximal humerus fracture with interval healing.  Glenohumeral joint intact.        1. Right shoulder pain, unspecified chronicity    2. Other closed displaced fracture of proximal end of right humerus with routine healing, subsequent encounter           Plan:       Orders Placed This Encounter    X-ray Shoulder 2 or More Views Right     Imaging and exam findings discussed with the patient and her family.  Right shoulder pain and function are slowly and persistently improving.  Radiographs are stable with some interval callus.  Continue therapy and transition to home exercises.  Patient is very eager to continue activities only on her own rather than continue physical therapy.  Follow up as needed. All of her questions were answered and she was in agreement.

## 2024-06-18 ENCOUNTER — LAB VISIT (OUTPATIENT)
Dept: LAB | Facility: HOSPITAL | Age: 89
End: 2024-06-18
Attending: INTERNAL MEDICINE
Payer: MEDICARE

## 2024-06-18 DIAGNOSIS — Z00.01 ENCOUNTER FOR GENERAL ADULT MEDICAL EXAMINATION WITH ABNORMAL FINDINGS: ICD-10-CM

## 2024-06-18 DIAGNOSIS — E07.9 DISEASE OF THYROID GLAND: ICD-10-CM

## 2024-06-18 DIAGNOSIS — E55.9 AVITAMINOSIS D: ICD-10-CM

## 2024-06-18 DIAGNOSIS — E11.9 DIABETES MELLITUS WITHOUT COMPLICATION: ICD-10-CM

## 2024-06-18 DIAGNOSIS — I10 ESSENTIAL HYPERTENSION, MALIGNANT: Primary | ICD-10-CM

## 2024-06-18 LAB
25(OH)D3+25(OH)D2 SERPL-MCNC: 29 NG/ML (ref 30–80)
ALBUMIN SERPL-MCNC: 3.5 G/DL (ref 3.4–4.8)
ALBUMIN/GLOB SERPL: 0.9 RATIO (ref 1.1–2)
ALP SERPL-CCNC: 71 UNIT/L (ref 40–150)
ALT SERPL-CCNC: 12 UNIT/L (ref 0–55)
ANION GAP SERPL CALC-SCNC: 7 MEQ/L
AST SERPL-CCNC: 19 UNIT/L (ref 5–34)
BASOPHILS # BLD AUTO: 0.06 X10(3)/MCL
BASOPHILS NFR BLD AUTO: 0.9 %
BILIRUB SERPL-MCNC: 0.2 MG/DL
BUN SERPL-MCNC: 15 MG/DL (ref 9.8–20.1)
CALCIUM SERPL-MCNC: 9.1 MG/DL (ref 8.4–10.2)
CHLORIDE SERPL-SCNC: 108 MMOL/L (ref 98–111)
CHOLEST SERPL-MCNC: 167 MG/DL
CHOLEST/HDLC SERPL: 3 {RATIO} (ref 0–5)
CO2 SERPL-SCNC: 26 MMOL/L (ref 23–31)
CREAT SERPL-MCNC: 0.74 MG/DL (ref 0.55–1.02)
CREAT UR-MCNC: 62 MG/DL (ref 45–106)
CREAT/UREA NIT SERPL: 20
EOSINOPHIL # BLD AUTO: 0.22 X10(3)/MCL (ref 0–0.9)
EOSINOPHIL NFR BLD AUTO: 3.1 %
ERYTHROCYTE [DISTWIDTH] IN BLOOD BY AUTOMATED COUNT: 14.3 % (ref 11.5–17)
EST. AVERAGE GLUCOSE BLD GHB EST-MCNC: 114 MG/DL
GFR SERPLBLD CREATININE-BSD FMLA CKD-EPI: >60 ML/MIN/1.73/M2
GLOBULIN SER-MCNC: 3.9 GM/DL (ref 2.4–3.5)
GLUCOSE SERPL-MCNC: 103 MG/DL (ref 75–121)
HBA1C MFR BLD: 5.6 %
HCT VFR BLD AUTO: 36.5 % (ref 37–47)
HDLC SERPL-MCNC: 59 MG/DL (ref 35–60)
HGB BLD-MCNC: 11.5 G/DL (ref 12–16)
IMM GRANULOCYTES # BLD AUTO: 0.01 X10(3)/MCL (ref 0–0.04)
IMM GRANULOCYTES NFR BLD AUTO: 0.1 %
LDLC SERPL CALC-MCNC: 96 MG/DL (ref 50–140)
LYMPHOCYTES # BLD AUTO: 2.54 X10(3)/MCL (ref 0.6–4.6)
LYMPHOCYTES NFR BLD AUTO: 36.3 %
MCH RBC QN AUTO: 27.1 PG (ref 27–31)
MCHC RBC AUTO-ENTMCNC: 31.5 G/DL (ref 33–36)
MCV RBC AUTO: 86.1 FL (ref 80–94)
MICROALBUMIN UR-MCNC: 6 UG/ML
MICROALBUMIN/CREAT RATIO PNL UR: 9.7 MG/GM CR (ref 0–30)
MONOCYTES # BLD AUTO: 0.52 X10(3)/MCL (ref 0.1–1.3)
MONOCYTES NFR BLD AUTO: 7.4 %
NEUTROPHILS # BLD AUTO: 3.64 X10(3)/MCL (ref 2.1–9.2)
NEUTROPHILS NFR BLD AUTO: 52.2 %
NRBC BLD AUTO-RTO: 0 %
PLATELET # BLD AUTO: 229 X10(3)/MCL (ref 130–400)
PMV BLD AUTO: 10.6 FL (ref 7.4–10.4)
POTASSIUM SERPL-SCNC: 4.2 MMOL/L (ref 3.5–5.1)
PROT SERPL-MCNC: 7.4 GM/DL (ref 5.8–7.6)
RBC # BLD AUTO: 4.24 X10(6)/MCL (ref 4.2–5.4)
SODIUM SERPL-SCNC: 141 MMOL/L (ref 136–145)
TRIGL SERPL-MCNC: 59 MG/DL (ref 37–140)
TSH SERPL-ACNC: 0.81 UIU/ML (ref 0.35–4.94)
VLDLC SERPL CALC-MCNC: 12 MG/DL
WBC # BLD AUTO: 6.99 X10(3)/MCL (ref 4.5–11.5)

## 2024-06-18 PROCEDURE — 80061 LIPID PANEL: CPT

## 2024-06-18 PROCEDURE — 80053 COMPREHEN METABOLIC PANEL: CPT

## 2024-06-18 PROCEDURE — 84443 ASSAY THYROID STIM HORMONE: CPT

## 2024-06-18 PROCEDURE — 36415 COLL VENOUS BLD VENIPUNCTURE: CPT

## 2024-06-18 PROCEDURE — 85025 COMPLETE CBC W/AUTO DIFF WBC: CPT

## 2024-06-18 PROCEDURE — 82306 VITAMIN D 25 HYDROXY: CPT

## 2024-06-18 PROCEDURE — 83036 HEMOGLOBIN GLYCOSYLATED A1C: CPT

## 2024-06-18 PROCEDURE — 82570 ASSAY OF URINE CREATININE: CPT

## 2024-11-16 ENCOUNTER — HOSPITAL ENCOUNTER (EMERGENCY)
Facility: HOSPITAL | Age: 89
Discharge: HOME | End: 2024-11-16
Attending: EMERGENCY MEDICINE
Payer: MEDICARE

## 2024-11-16 VITALS
BODY MASS INDEX: 23.66 KG/M2 | TEMPERATURE: 98 F | RESPIRATION RATE: 18 BRPM | DIASTOLIC BLOOD PRESSURE: 72 MMHG | HEIGHT: 65 IN | WEIGHT: 142 LBS | SYSTOLIC BLOOD PRESSURE: 146 MMHG | OXYGEN SATURATION: 100 % | HEART RATE: 90 BPM

## 2024-11-16 DIAGNOSIS — R21 SKIN RASH: ICD-10-CM

## 2024-11-16 DIAGNOSIS — L53.1 ERYTHEMA ANNULARE CENTRIFUGUM: Primary | ICD-10-CM

## 2024-11-16 PROCEDURE — 99284 EMERGENCY DEPT VISIT MOD MDM: CPT

## 2024-11-16 RX ORDER — PREDNISONE 20 MG/1
40 TABLET ORAL 2 TIMES DAILY
Qty: 10 TABLET | Refills: 0 | Status: SHIPPED | OUTPATIENT
Start: 2024-11-16 | End: 2024-11-21

## 2024-11-16 RX ORDER — HYDROXYZINE PAMOATE 25 MG/1
25 CAPSULE ORAL EVERY 8 HOURS PRN
Qty: 15 CAPSULE | Refills: 0 | Status: SHIPPED | OUTPATIENT
Start: 2024-11-16 | End: 2024-11-21

## 2024-11-16 RX ORDER — CALAMINE, PRAMOXIND HCL 8.6; 20; 1 MG/ML; MG/ML; MG/ML
LOTION TOPICAL 2 TIMES DAILY PRN
Qty: 177 ML | Refills: 0 | Status: SHIPPED | OUTPATIENT
Start: 2024-11-16

## 2024-11-16 NOTE — ED PROVIDER NOTES
Encounter Date: 11/16/2024       History     Chief Complaint   Patient presents with    Recheck     Pt complaint of unresolved intermittent whelps relating two previous healthcare visits with RX written by Dr Maria, PCP and Dr Sanon, dermatologist whom gave a shot. Pt complaint of whelps and itching to upper leg area     Patient is a 89-year-old female who presents with complaint of rash.  She has seen her primary care doctor, Dr. Musa in addition to Dr. Juarez, a local dermatologist. After a review of patient's medications it appears that she was prescribed prednisone 20 mg daily x7 days on November 4th.  Bactrim was prescribed as well for 7 days.  Two days ago she was prescribed betamethasone topical and that she received a shot.  She reports no improvement with any of the treatments.  The rash is still very pruritic; it is localized in the bilateral proximal thighs, buttocks, on her lower abdomen, and around her wrists.   She reports they come and go, and change shape.  She denies any fevers, chest pain, shortness of breath, or other complaints at this time.        Review of patient's allergies indicates:  No Known Allergies  Past Medical History:   Diagnosis Date    Unspecified cataract      Past Surgical History:   Procedure Laterality Date    CATARACT EXTRACTION      TONSILLECTOMY       Family History   Problem Relation Name Age of Onset    No Known Problems Mother      No Known Problems Father       Social History     Tobacco Use    Smoking status: Never    Smokeless tobacco: Never   Substance Use Topics    Alcohol use: Never    Drug use: Never     Review of Systems   Skin:  Positive for rash.       Physical Exam     Initial Vitals [11/16/24 1003]   BP Pulse Resp Temp SpO2   (!) 146/72 90 18 98.2 °F (36.8 °C) 100 %      MAP       --         Physical Exam    Nursing note and vitals reviewed.  Constitutional: She appears well-developed and well-nourished. No distress.   HENT:   Head: Normocephalic and  atraumatic.   Neck: Neck supple.   Cardiovascular:  Normal rate, regular rhythm and normal heart sounds.           No murmur heard.  Pulmonary/Chest: Breath sounds normal. No respiratory distress. She has no wheezes. She has no rhonchi.   Musculoskeletal:         General: No edema. Normal range of motion.      Cervical back: Neck supple.     Neurological: She is alert and oriented to person, place, and time.   Skin: Skin is warm and dry.   Lightly red raised circular and semicircular lesions on the buttock, abdomen, and proximal thighs. She has some smaller, but similar lesions on the bilateral wrists.   Psychiatric: She has a normal mood and affect. Thought content normal.                Media Information    File Link    Scan on 11/16/2024 10:29 AM by Zo Smiley MD        Marley Information    Document ID File Type Document Type Description   [id number] Image Photographs      Import Information    Attached At Date Time User Dept   Encounter Level 11/16/2024 10:29 AM Zo Smiley MD [facility] Emergency Department     Encounter    Hospital Encounter on 11/16/24       ED Course   Procedures  Labs Reviewed - No data to display       Imaging Results    None          Medications - No data to display  Medical Decision Making  Patient is a 89-year-old female presenting with a rash.  She is currently being followed by Dermatology and her primary care doctor for this rash.  At this time diagnosis is uncertain but does not appear to be an emergent rash.  She has no signs of anaphylactic reaction with tongue swelling or shortness of breath, abdominal pain, N/V/D. 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     Results were reviewed with patient. Questions were answered along with a discussion of signs and  symptoms to return for. Patient comfortable with plan of discharge.      Problems Addressed:  Erythema annulare centrifugum: acute illness or injury  Skin rash: acute illness or injury    Risk  OTC drugs.  Prescription drug management.                                      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 Provider    predniSONE (DELTASONE) 20 MG tablet Take 2 tablets (40 mg total) by mouth 2 (two) times daily. for 5 days 10 tablet 11/16/2024 11/21/2024 Zo Smiley MD    hydrOXYzine pamoate (VISTARIL) 25 MG Cap Take 1 capsule (25 mg total) by mouth every 8 (eight) hours as needed (itching). 15 capsule 11/16/2024 11/21/2024 Zo Smiley MD    diphenhydramine-calamine (CALAMINE MEDICATED) 1-8 % Lotn Apply topically 2 (two) times daily as needed (itching). 177 mL 11/16/2024 -- Zo Smiley MD          Follow-up Information       Follow up With Specialties Details Why Contact Info    Christopher Juarez MD Dermatology Schedule an appointment as soon as possible for a visit  If symptoms worsen, return to the ED [address] [place] [address] Rd  [address]  [facility] [address]  [phone]               Zo Smiley MD  11/17/24 0391

## 2024-11-16 NOTE — ED TRIAGE NOTES
Pt complaint of unresolved intermittent whelps relating two previous healthcare visits with RX written by Dr Maria, PCP and Dr Sanon, dermatologist whom gave a shot. Pt complaint of whelps and itching to upper leg area

## 2024-11-27 RX ORDER — PREDNISONE 20 MG/1
20 TABLET ORAL 2 TIMES DAILY
Qty: 10 TABLET | Refills: 0 | Status: SHIPPED | OUTPATIENT
Start: 2024-11-27 | End: 2024-12-02

## 2024-11-30 ENCOUNTER — HOSPITAL ENCOUNTER (EMERGENCY)
Facility: HOSPITAL | Age: 89
Discharge: HOME | End: 2024-11-30
Attending: EMERGENCY MEDICINE
Payer: MEDICARE

## 2024-11-30 VITALS
HEART RATE: 75 BPM | TEMPERATURE: 98 F | HEIGHT: 66 IN | SYSTOLIC BLOOD PRESSURE: 149 MMHG | WEIGHT: 145.06 LBS | RESPIRATION RATE: 18 BRPM | DIASTOLIC BLOOD PRESSURE: 72 MMHG | BODY MASS INDEX: 23.31 KG/M2 | OXYGEN SATURATION: 96 %

## 2024-11-30 DIAGNOSIS — T78.3XXA ANGIOEDEMA, INITIAL ENCOUNTER: Primary | ICD-10-CM

## 2024-11-30 PROCEDURE — 99284 EMERGENCY DEPT VISIT MOD MDM: CPT | Mod: 25

## 2024-11-30 PROCEDURE — 96375 TX/PRO/DX INJ NEW DRUG ADDON: CPT

## 2024-11-30 PROCEDURE — 25000003 PHARM REV CODE 250: Performed by: EMERGENCY MEDICINE

## 2024-11-30 PROCEDURE — 96374 THER/PROPH/DIAG INJ IV PUSH: CPT

## 2024-11-30 PROCEDURE — 63600175 PHARM REV CODE 636 W HCPCS: Performed by: EMERGENCY MEDICINE

## 2024-11-30 RX ORDER — DEXAMETHASONE SODIUM PHOSPHATE 4 MG/ML
8 INJECTION, SOLUTION INTRA-ARTICULAR; INTRALESIONAL; INTRAMUSCULAR; INTRAVENOUS; SOFT TISSUE
Status: COMPLETED | OUTPATIENT
Start: 2024-11-30 | End: 2024-11-30

## 2024-11-30 RX ORDER — FAMOTIDINE 10 MG/ML
20 INJECTION INTRAVENOUS
Status: COMPLETED | OUTPATIENT
Start: 2024-11-30 | End: 2024-11-30

## 2024-11-30 RX ORDER — PREDNISONE 20 MG/1
40 TABLET ORAL DAILY
Qty: 10 TABLET | Refills: 0 | Status: SHIPPED | OUTPATIENT
Start: 2024-11-30 | End: 2024-12-05

## 2024-11-30 RX ORDER — DIPHENHYDRAMINE HYDROCHLORIDE 50 MG/ML
25 INJECTION INTRAMUSCULAR; INTRAVENOUS
Status: COMPLETED | OUTPATIENT
Start: 2024-11-30 | End: 2024-11-30

## 2024-11-30 RX ADMIN — FAMOTIDINE 20 MG: 10 INJECTION, SOLUTION INTRAVENOUS at 11:11

## 2024-11-30 RX ADMIN — DIPHENHYDRAMINE HYDROCHLORIDE 25 MG: 50 INJECTION INTRAMUSCULAR; INTRAVENOUS at 11:11

## 2024-11-30 RX ADMIN — DEXAMETHASONE SODIUM PHOSPHATE 8 MG: 4 INJECTION, SOLUTION INTRA-ARTICULAR; INTRALESIONAL; INTRAMUSCULAR; INTRAVENOUS; SOFT TISSUE at 11:11

## 2024-11-30 NOTE — ED PROVIDER NOTES
Encounter Date: 11/30/2024       History     Chief Complaint   Patient presents with    Oral Swelling     Noticed tongue was swollen at 0300 today; states that symptom is resolving. Also complaining generalized itching x3 weeks     89-year-old female history of hypertension on Norvasc says she woke up this morning around 3:00 a.m. with a swollen tongue.  However her swelling is much improved.  No trouble speaking or swallowing.  No shortness of breath or wheezing.  No rash.  Patient ate a can of chicken noodle soup which he thinks may have caused the swelling however she has no known allergies.  Several weeks ago patient did have generalized rash and was diagnosed with erythema annulare.  She was placed on 40 mg of prednisone b.i.d. and said her symptoms had resolved.  +itching for 3 weeks but no rash    The history is provided by the patient.     Review of patient's allergies indicates:  No Known Allergies  Past Medical History:   Diagnosis Date    Unspecified cataract      Past Surgical History:   Procedure Laterality Date    CATARACT EXTRACTION      TONSILLECTOMY       Family History   Problem Relation Name Age of Onset    No Known Problems Mother      No Known Problems Father       Social History     Tobacco Use    Smoking status: Never    Smokeless tobacco: Never   Substance Use Topics    Alcohol use: Never    Drug use: Never     Review of Systems   Constitutional:  Negative for chills, diaphoresis, fatigue and fever.   HENT:  Negative for congestion, drooling, ear discharge, ear pain, postnasal drip, rhinorrhea, sinus pressure, sinus pain, sore throat and tinnitus.    Eyes:  Negative for discharge.   Respiratory:  Negative for cough, chest tightness, shortness of breath and wheezing.    Cardiovascular:  Negative for chest pain and palpitations.   Gastrointestinal:  Negative for abdominal pain, diarrhea, nausea and vomiting.   Genitourinary:  Negative for frequency, hematuria and urgency.   Musculoskeletal:   Negative for back pain, neck pain and neck stiffness.   Skin:  Negative for rash.   Neurological:  Negative for syncope, weakness, light-headedness, numbness and headaches.   Psychiatric/Behavioral:  Negative for suicidal ideas.        Physical Exam     Initial Vitals [11/30/24 1122]   BP Pulse Resp Temp SpO2   (!) 176/98 88 18 98 °F (36.7 °C) 97 %      MAP       --         Physical Exam    Nursing note and vitals reviewed.  Constitutional: She appears well-developed and well-nourished. No distress.   HENT: Mouth/Throat: Uvula is midline, oropharynx is clear and moist and mucous membranes are normal. No uvula swelling.   No tongue swelling or tongue protrusion.  Voice is unchanged.   Eyes: Conjunctivae are normal.   Neck: Phonation normal.   Cardiovascular:  Normal rate.           Pulmonary/Chest: Breath sounds normal. No respiratory distress.   Musculoskeletal:         General: Normal range of motion.     Neurological: She is alert and oriented to person, place, and time.   Skin: Skin is warm and dry. No rash noted. No erythema. No pallor.         ED Course   Procedures  Labs Reviewed - No data to display       Imaging Results    None          Medications   famotidine (PF) injection 20 mg (20 mg Intravenous Given 11/30/24 1149)   diphenhydrAMINE injection 25 mg (25 mg Intravenous Given 11/30/24 1148)   dexAMETHasone injection 8 mg (8 mg Intravenous Given 11/30/24 1148)     Medical Decision Making  Medical Decision Making  Problem list/ differential diagnosis including but not limited to:  Angioedema, allergic reaction to food medicine, Dwight's angina    Patient's chronic illnesses impacting care:  none    Diagnostic test considered but not ordered:    My interpretations:      Radiology reports      Discussion of case with external qualified healthcare professionals:  Not applicable    Review of external notes( inpt, ems, NH, clinic):      Decision rules/scores:    Medications reviewed:  Medications ordered in the  ER:  Benadryl, Decadron, Pepcid  Discharge prescriptions:  Prednisone    Social variables possible impacting patient's healthcare:    Code status/discussion    Shared decision making:    Consideration for admission versus discharge: stable for discharge     Risk  Prescription drug management.               ED Course as of 11/30/24 1324   Sat Nov 30, 2024   1322 Patient is still doing well.  Tongue swelling has resolved.  No rash or difficulty speaking or swallowing.  No shortness of breath [WC]      ED Course User Index  [WC] Julian Purdy MD                           Clinical Impression:  Final diagnoses:  [T78.3XXA] Angioedema, initial encounter (Primary)          ED Disposition Condition    Discharge Stable          ED Prescriptions       Medication Sig Dispense Start Date End Date Auth. Provider    predniSONE (DELTASONE) 20 MG tablet Take 2 tablets (40 mg total) by mouth once daily. for 5 days 10 tablet 11/30/2024 12/5/2024 Julian Purdy MD          Follow-up Information       Follow up With Specialties Details Why Contact Info    [facility] - Emergency Dept Emergency Medicine  If symptoms worsen [id number]  [facility] [id number] [phone]             Julian Purdy MD  11/30/24 9035

## 2024-12-01 ENCOUNTER — HOSPITAL ENCOUNTER (EMERGENCY)
Facility: HOSPITAL | Age: 89
Discharge: HOME | End: 2024-12-01
Attending: EMERGENCY MEDICINE
Payer: MEDICARE

## 2024-12-01 ENCOUNTER — OCHSNER VIRTUAL EMERGENCY DEPARTMENT (OUTPATIENT)
Facility: CLINIC | Age: 89
End: 2024-12-01
Payer: MEDICARE

## 2024-12-01 ENCOUNTER — NURSE TRIAGE (OUTPATIENT)
Dept: ADMINISTRATIVE | Facility: CLINIC | Age: 89
End: 2024-12-01
Payer: MEDICARE

## 2024-12-01 VITALS
HEART RATE: 80 BPM | BODY MASS INDEX: 25.11 KG/M2 | HEIGHT: 64 IN | OXYGEN SATURATION: 97 % | RESPIRATION RATE: 20 BRPM | TEMPERATURE: 98 F | DIASTOLIC BLOOD PRESSURE: 57 MMHG | SYSTOLIC BLOOD PRESSURE: 156 MMHG | WEIGHT: 147.06 LBS

## 2024-12-01 DIAGNOSIS — R22.0 LIP SWELLING: ICD-10-CM

## 2024-12-01 DIAGNOSIS — T78.3XXD ANGIOEDEMA, SUBSEQUENT ENCOUNTER: Primary | ICD-10-CM

## 2024-12-01 LAB
GROUP & RH: NORMAL
HOLD SPECIMEN: NORMAL
INDIRECT COOMBS: NORMAL
SPECIMEN OUTDATE: NORMAL

## 2024-12-01 PROCEDURE — 86901 BLOOD TYPING SEROLOGIC RH(D): CPT | Performed by: EMERGENCY MEDICINE

## 2024-12-01 PROCEDURE — 99283 EMERGENCY DEPT VISIT LOW MDM: CPT | Mod: 25

## 2024-12-01 PROCEDURE — 63600175 PHARM REV CODE 636 W HCPCS: Performed by: EMERGENCY MEDICINE

## 2024-12-01 RX ORDER — PREDNISONE 10 MG/1
40 TABLET ORAL
Status: COMPLETED | OUTPATIENT
Start: 2024-12-01 | End: 2024-12-01

## 2024-12-01 RX ADMIN — PREDNISONE 40 MG: 10 TABLET ORAL at 06:12

## 2024-12-01 NOTE — TELEPHONE ENCOUNTER
Non-[facility] PCP    Patient's daughter states patient was seen in the ED last evening, 11/30/24, for c/o Oral Swelling/Tongue Swelling. Patient's daughter states patient was given IV Benadryl, Pepcid and a Steroid. Daughter states patient's tongue swelling is resolving but on today at approximately 3:30 PM patient c/o onset of left bottom lip severe swelling. Patient denies fever and pain. Daughter states she gave patient a dose of Benadryl at 4:30 PM and symptoms persists.     Care Disposition advise to GO TO ED/C NOW (OR PCP TRIAGE). [facility] On Call Provider, Dr. Nancy Ross advised for patient to Go to ED Now for evaluation.     Patient's daughter advised to bring patient to ED Now. Daughter also advised to contact the [facility] on Call Service for any worsening symptoms. Patient's daughter states understanding of care advice.                   Reason for Disposition   [1] Severe swelling AND [2] cause unknown    Additional Information   Negative: Unresponsive, passed out or very weak   Negative: Swollen tongue   Negative: Difficulty breathing or wheezing   Negative: [1] Life-threatening reaction in the past to similar substance (e.g., food, insect bite/sting, chemical, etc.) AND [2] < 2 hours since exposure   Negative: Sounds like a life-threatening emergency to the triager   Negative: Followed a lip injury   Negative: Entire face is swollen   Negative: Followed an insect bite to the area   Negative: Cold sore suspected (i.e., fever blister sore on the outer lip)   Negative: Mouth sores or ulcers on inner lip   Negative: [1] Contact with a chemical AND [2] some may have been swallowed   Negative: [1] Contact with a chemical AND [2] none entered the mouth   Negative: Taking an ACE Inhibitor medicine (e.g., benazepril / LOTENSIN, captopril / CAPOTEN, enalapril / VASOTEC, lisinopril / ZESTRIL)    Protocols used: Lip Swelling-A-AH

## 2024-12-01 NOTE — PLAN OF CARE-OVED
[facility] Emergency Department Plan of Care Note    Referral source: Nurse On-Call      Reason for consult:  lip swelling       Additional History: 89 yo female presented to the ED last evening, 11/30/24, for Oral Swelling/Tongue Swelling. Patient's daughter states patient was given IV Benadryl, Pepcid and a Steroid for concern for possible angioedema. She was recently diagnosed with erythema annulare treated with recent prednisone with improvement.  She was discharged on prednisone 40 mg qday x5 days last night. Daughter states patient's tongue swelling is resolving but on today at approximately 3:30 PM patient c/o onset of left bottom lip severe swelling. Patient denies fever and pain Denies SOB. Daughter states she gave patient a dose of Benadryl at 4:30 PM and symptoms persists. Patient dose have irbesartan listed as home medications. Recommended be evaluated at ED immediately for concern for possible angioedema.       Disposition recommended: Emergency Department      Additional Recommendations: Recommended to go to ED immediately.

## 2024-12-02 NOTE — ED PROVIDER NOTES
"ED PROVIDER NOTE  12/1/2024    CHIEF COMPLAINT:   Chief Complaint   Patient presents with    Oral Swelling     Left lower lip swelling that started around 3pm today. Denies throat swelling,trouble breathing, or worsening of swelling. "It's about the same."No distress.        HISTORY OF PRESENT ILLNESS:   Paula Nicole is a 89 y.o. female who presents with chief complaint Lip swelling.  Onset was around 15 30 today whenever she began having swelling to her left lower lip that has been persistent but not progressive.  Denies shortness of breath,, nausea, vomiting, diarrhea, rash, itching.  She reports that yesterday she went to another ED due to having swelling of her tongue that has since resolved.  She is on an angiotensin receptor blocker.  She took 20 mg of prednisone and Benadryl prior to arrival.    The history is provided by the patient.         REVIEW OF SYSTEMS: as noted in the HPI.  NURSING NOTES REVIEWED      PAST MEDICAL/SURGICAL HISTORY:   Past Medical History:   Diagnosis Date    Unspecified cataract       Past Surgical History:   Procedure Laterality Date    CATARACT EXTRACTION      TONSILLECTOMY         FAMILY HISTORY:   Family History   Problem Relation Name Age of Onset    No Known Problems Mother      No Known Problems Father         SOCIAL HISTORY:   Social History     Tobacco Use    Smoking status: Never    Smokeless tobacco: Never   Substance Use Topics    Alcohol use: Never    Drug use: Never       ALLERGIES: Review of patient's allergies indicates:  No Known Allergies    PHYSICAL EXAM:  Initial Vitals [12/01/24 1831]   BP Pulse Resp Temp SpO2   (!) 150/48 65 20 97.7 °F (36.5 °C) 96 %      MAP       --         Physical Exam    Nursing note and vitals reviewed.  Constitutional: She appears well-developed and well-nourished.   HENT:   Head: Normocephalic and atraumatic. Mouth/Throat: Uvula is midline and mucous membranes are normal.   Left lower lip swelling, tongue and uvula normal.   Eyes: EOM are " normal. Pupils are equal, round, and reactive to light.   Neck: Trachea normal. Neck supple.   Cardiovascular:  Normal rate, regular rhythm and normal pulses.           Pulmonary/Chest: Effort normal and breath sounds normal. She has no wheezes.   Abdominal: Abdomen is soft. Bowel sounds are normal. There is no rebound and no guarding.   Musculoskeletal:         General: Normal range of motion.      Cervical back: Neck supple.     Neurological: She is alert and oriented to person, place, and time. GCS eye subscore is 4. GCS verbal subscore is 5. GCS motor subscore is 6.   Skin: Skin is warm and dry.   Psychiatric: She has a normal mood and affect. Her speech is normal. Thought content normal.         RESULTS:  Labs Reviewed   EXTRA TUBES    Narrative:     The following orders were created for panel order EXTRA TUBES.  Procedure                               Abnormality         Status                     ---------                               -----------         ------                     Light Blue Top Hold[[id number]]                             Final result               Red Top Hold[[id number]]                                    Final result               Light Green Top Hold[[id number]]                            Final result               Lavender Top Hold[[id number]]                               Final result               Gold Top Hold[[id number]]                                   Final result                 Please view results for these tests on the individual orders.   LIGHT BLUE TOP HOLD       Result Value    Extra Tube Hold for add-ons.     RED TOP HOLD    Extra Tube Hold for add-ons.     LIGHT GREEN TOP HOLD    Extra Tube Hold for add-ons.     LAVENDER TOP HOLD    Extra Tube Hold for add-ons.     GOLD TOP HOLD    Extra Tube Hold for add-ons.     TYPE & SCREEN    Group & Rh A POS      Indirect Ivan GEL NEG      Specimen Outdate 12/04/2024 23:59       Imaging Results    None    "      PROCEDURES:  Procedures    ECG:       ED COURSE AND MEDICAL DECISION MAKING:  Medications   predniSONE tablet 40 mg (40 mg Oral Given 12/1/24 1849)         7:43 PM I assumed care at change of shift, case discussed with Dr. Rosenthal in detail, data and records reviewed, patient interviewed and examined.  89-year-old presenting with lip swelling, seen elsewhere yesterday and thought possibly to have an allergic reaction to food, persistent after treatment with H1 & H2 blockers and steroids.  She continues to on her ARB, now being considered as a possible etiology.  Airway is clear, no decompensation.  Monitoring closely.    Gabriel Sheldon MD        9:01 PM Vital signs stable, blood pressure normal, lip swelling improved, no additional swelling elsewhere and no other complaints.  Counseled patient and family in detail.  At this point, it does appear that she has had a mild angioedema reaction to her angiotensin receptor blocker medication with initially tongue and now lip swelling.  Her most recent dose was this morning.  Examined in detail, no intraoral or airway involvement on a clinical basis, upper endoscopy not indicated at present based on exam, symptoms, and progression.  Counseled, recommend discharge with close observation and ER return precautions, given the specific discharge instructions as follows:        As discussed, we think the tongue and lip swelling are a specific kind of allergic reaction to your blood pressure medicine (irbesartan/ Avapro) known as angioedema.      This type of reaction can happened to any of the blood pressure medicines in the category of "ACE inhibitors" or "ARB's".      Stop taking this medicine and talk to your regular doctor tomorrow about a possible replacement.      Return here at any time as needed or if worse.      No additional prescriptions recommended at this time.        Gabriel Sheldon MD          Medical Decision Making      CLINICAL IMPRESSION:  1. " Angioedema, subsequent encounter    2. Lip swelling        DISPOSITION:   ED Disposition Condition    Discharge Stable            ED Prescriptions    None       Follow-up Information       Follow up With Specialties Details Why Contact Info    [facility] - Emergency Dept Emergency Medicine  As needed [address] [facility] [address] [phone]               Gabriel Sheldon MD  12/01/24 5493

## 2024-12-02 NOTE — DISCHARGE INSTRUCTIONS
"    As discussed, we think the tongue and lip swelling are a specific kind of allergic reaction to your blood pressure medicine (irbesartan/ Avapro) known as angioedema.      This type of reaction can happened to any of the blood pressure medicines in the category of "ACE inhibitors" or "ARB's".      Stop taking this medicine and talk to your regular doctor tomorrow about a possible replacement.      Return here at any time as needed or if worse.      No additional prescriptions recommended at this time.      "

## 2025-01-26 ENCOUNTER — HOSPITAL ENCOUNTER (OUTPATIENT)
Facility: HOSPITAL | Age: OVER 89
Discharge: HOME | End: 2025-01-27
Attending: EMERGENCY MEDICINE | Admitting: INTERNAL MEDICINE
Payer: MEDICARE

## 2025-01-26 DIAGNOSIS — R22.0 TONGUE SWELLING: ICD-10-CM

## 2025-01-26 DIAGNOSIS — R07.9 CHEST PAIN: ICD-10-CM

## 2025-01-26 DIAGNOSIS — I16.0 HYPERTENSIVE URGENCY: ICD-10-CM

## 2025-01-26 DIAGNOSIS — T78.3XXA ANGIOEDEMA, INITIAL ENCOUNTER: Primary | ICD-10-CM

## 2025-01-26 LAB
ALBUMIN SERPL-MCNC: 3.8 G/DL (ref 3.4–4.8)
ALBUMIN/GLOB SERPL: 0.9 RATIO (ref 1.1–2)
ALP SERPL-CCNC: 62 UNIT/L (ref 40–150)
ALT SERPL-CCNC: 10 UNIT/L (ref 0–55)
ANION GAP SERPL CALC-SCNC: 11 MEQ/L
AST SERPL-CCNC: 19 UNIT/L (ref 5–34)
BASOPHILS # BLD AUTO: 0.06 X10(3)/MCL
BASOPHILS NFR BLD AUTO: 0.9 %
BILIRUB SERPL-MCNC: 0.5 MG/DL
BUN SERPL-MCNC: 10.8 MG/DL (ref 9.8–20.1)
C4 SERPL-MCNC: 35 MG/DL (ref 13–46)
CALCIUM SERPL-MCNC: 9.6 MG/DL (ref 8.4–10.2)
CHLORIDE SERPL-SCNC: 106 MMOL/L (ref 98–111)
CO2 SERPL-SCNC: 23 MMOL/L (ref 23–31)
CREAT SERPL-MCNC: 0.68 MG/DL (ref 0.55–1.02)
CREAT/UREA NIT SERPL: 16
CRP SERPL-MCNC: 3.9 MG/L
EOSINOPHIL # BLD AUTO: 0.19 X10(3)/MCL (ref 0–0.9)
EOSINOPHIL NFR BLD AUTO: 2.7 %
ERYTHROCYTE [DISTWIDTH] IN BLOOD BY AUTOMATED COUNT: 14.3 % (ref 11.5–17)
ERYTHROCYTE [SEDIMENTATION RATE] IN BLOOD: 35 MM/HR (ref 0–20)
GFR SERPLBLD CREATININE-BSD FMLA CKD-EPI: >60 ML/MIN/1.73/M2
GLOBULIN SER-MCNC: 4.2 GM/DL (ref 2.4–3.5)
GLUCOSE SERPL-MCNC: 138 MG/DL (ref 75–121)
HCT VFR BLD AUTO: 42 % (ref 37–47)
HGB BLD-MCNC: 13.1 G/DL (ref 12–16)
IMM GRANULOCYTES # BLD AUTO: 0.02 X10(3)/MCL (ref 0–0.04)
IMM GRANULOCYTES NFR BLD AUTO: 0.3 %
LYMPHOCYTES # BLD AUTO: 2.13 X10(3)/MCL (ref 0.6–4.6)
LYMPHOCYTES NFR BLD AUTO: 30.8 %
MAGNESIUM SERPL-MCNC: 2.1 MG/DL (ref 1.6–2.6)
MCH RBC QN AUTO: 27 PG (ref 27–31)
MCHC RBC AUTO-ENTMCNC: 31.2 G/DL (ref 33–36)
MCV RBC AUTO: 86.4 FL (ref 80–94)
MONOCYTES # BLD AUTO: 0.62 X10(3)/MCL (ref 0.1–1.3)
MONOCYTES NFR BLD AUTO: 9 %
NEUTROPHILS # BLD AUTO: 3.9 X10(3)/MCL (ref 2.1–9.2)
NEUTROPHILS NFR BLD AUTO: 56.3 %
NRBC BLD AUTO-RTO: 0 %
PHOSPHATE SERPL-MCNC: 2.4 MG/DL (ref 2.3–4.7)
PLATELET # BLD AUTO: 222 X10(3)/MCL (ref 130–400)
PMV BLD AUTO: 12.2 FL (ref 7.4–10.4)
POCT GLUCOSE: 151 MG/DL (ref 70–110)
POTASSIUM SERPL-SCNC: 3.8 MMOL/L (ref 3.5–5.1)
PROT SERPL-MCNC: 8 GM/DL (ref 5.8–7.6)
RBC # BLD AUTO: 4.86 X10(6)/MCL (ref 4.2–5.4)
SODIUM SERPL-SCNC: 140 MMOL/L (ref 136–145)
WBC # BLD AUTO: 6.92 X10(3)/MCL (ref 4.5–11.5)

## 2025-01-26 PROCEDURE — 84100 ASSAY OF PHOSPHORUS: CPT

## 2025-01-26 PROCEDURE — 86160 COMPLEMENT ANTIGEN: CPT

## 2025-01-26 PROCEDURE — 96374 THER/PROPH/DIAG INJ IV PUSH: CPT

## 2025-01-26 PROCEDURE — 25000003 PHARM REV CODE 250: Performed by: EMERGENCY MEDICINE

## 2025-01-26 PROCEDURE — 25000003 PHARM REV CODE 250

## 2025-01-26 PROCEDURE — 85652 RBC SED RATE AUTOMATED: CPT

## 2025-01-26 PROCEDURE — 63600175 PHARM REV CODE 636 W HCPCS: Mod: JZ,TB | Performed by: EMERGENCY MEDICINE

## 2025-01-26 PROCEDURE — 63600175 PHARM REV CODE 636 W HCPCS: Performed by: INTERNAL MEDICINE

## 2025-01-26 PROCEDURE — 82785 ASSAY OF IGE: CPT

## 2025-01-26 PROCEDURE — G0378 HOSPITAL OBSERVATION PER HR: HCPCS

## 2025-01-26 PROCEDURE — 86140 C-REACTIVE PROTEIN: CPT

## 2025-01-26 PROCEDURE — 96375 TX/PRO/DX INJ NEW DRUG ADDON: CPT

## 2025-01-26 PROCEDURE — 80053 COMPREHEN METABOLIC PANEL: CPT

## 2025-01-26 PROCEDURE — 85025 COMPLETE CBC W/AUTO DIFF WBC: CPT

## 2025-01-26 PROCEDURE — 83735 ASSAY OF MAGNESIUM: CPT

## 2025-01-26 PROCEDURE — 36415 COLL VENOUS BLD VENIPUNCTURE: CPT

## 2025-01-26 PROCEDURE — 96376 TX/PRO/DX INJ SAME DRUG ADON: CPT

## 2025-01-26 PROCEDURE — 25000003 PHARM REV CODE 250: Performed by: INTERNAL MEDICINE

## 2025-01-26 PROCEDURE — 96372 THER/PROPH/DIAG INJ SC/IM: CPT

## 2025-01-26 PROCEDURE — 99285 EMERGENCY DEPT VISIT HI MDM: CPT | Mod: 25

## 2025-01-26 PROCEDURE — 63600175 PHARM REV CODE 636 W HCPCS

## 2025-01-26 PROCEDURE — 83520 IMMUNOASSAY QUANT NOS NONAB: CPT

## 2025-01-26 RX ORDER — HYDRALAZINE HYDROCHLORIDE 20 MG/ML
20 INJECTION INTRAMUSCULAR; INTRAVENOUS EVERY 4 HOURS PRN
Status: DISCONTINUED | OUTPATIENT
Start: 2025-01-26 | End: 2025-01-27 | Stop reason: HOSPADM

## 2025-01-26 RX ORDER — FAMOTIDINE 10 MG/ML
20 INJECTION INTRAVENOUS 2 TIMES DAILY
Status: DISCONTINUED | OUTPATIENT
Start: 2025-01-26 | End: 2025-01-27 | Stop reason: HOSPADM

## 2025-01-26 RX ORDER — CARVEDILOL 25 MG/1
25 TABLET ORAL 2 TIMES DAILY
Status: ON HOLD | COMMUNITY
Start: 2024-12-17 | End: 2025-01-27 | Stop reason: HOSPADM

## 2025-01-26 RX ORDER — DIPHENHYDRAMINE HYDROCHLORIDE 50 MG/ML
25 INJECTION INTRAMUSCULAR; INTRAVENOUS EVERY 8 HOURS
Status: DISCONTINUED | OUTPATIENT
Start: 2025-01-26 | End: 2025-01-27 | Stop reason: HOSPADM

## 2025-01-26 RX ORDER — DEXAMETHASONE SODIUM PHOSPHATE 4 MG/ML
4 INJECTION, SOLUTION INTRA-ARTICULAR; INTRALESIONAL; INTRAMUSCULAR; INTRAVENOUS; SOFT TISSUE
Status: COMPLETED | OUTPATIENT
Start: 2025-01-26 | End: 2025-01-26

## 2025-01-26 RX ORDER — IBUPROFEN 200 MG
24 TABLET ORAL
Status: DISCONTINUED | OUTPATIENT
Start: 2025-01-26 | End: 2025-01-27 | Stop reason: HOSPADM

## 2025-01-26 RX ORDER — METHYLPREDNISOLONE SOD SUCC 125 MG
125 VIAL (EA) INJECTION
Status: COMPLETED | OUTPATIENT
Start: 2025-01-26 | End: 2025-01-26

## 2025-01-26 RX ORDER — NALOXONE HCL 0.4 MG/ML
0.02 VIAL (ML) INJECTION
Status: DISCONTINUED | OUTPATIENT
Start: 2025-01-26 | End: 2025-01-27 | Stop reason: HOSPADM

## 2025-01-26 RX ORDER — FAMOTIDINE 10 MG/ML
20 INJECTION INTRAVENOUS
Status: COMPLETED | OUTPATIENT
Start: 2025-01-26 | End: 2025-01-26

## 2025-01-26 RX ORDER — FAMOTIDINE 10 MG/ML
40 INJECTION INTRAVENOUS
Status: COMPLETED | OUTPATIENT
Start: 2025-01-26 | End: 2025-01-26

## 2025-01-26 RX ORDER — GLUCAGON 1 MG
1 KIT INJECTION
Status: DISCONTINUED | OUTPATIENT
Start: 2025-01-26 | End: 2025-01-27 | Stop reason: HOSPADM

## 2025-01-26 RX ORDER — DEXAMETHASONE SODIUM PHOSPHATE 4 MG/ML
8 INJECTION, SOLUTION INTRA-ARTICULAR; INTRALESIONAL; INTRAMUSCULAR; INTRAVENOUS; SOFT TISSUE EVERY 8 HOURS
Status: DISCONTINUED | OUTPATIENT
Start: 2025-01-26 | End: 2025-01-27 | Stop reason: HOSPADM

## 2025-01-26 RX ORDER — SODIUM CHLORIDE 0.9 % (FLUSH) 0.9 %
10 SYRINGE (ML) INJECTION EVERY 12 HOURS PRN
Status: DISCONTINUED | OUTPATIENT
Start: 2025-01-26 | End: 2025-01-27 | Stop reason: HOSPADM

## 2025-01-26 RX ORDER — HYDRALAZINE HYDROCHLORIDE 50 MG/1
50 TABLET, FILM COATED ORAL ONCE
Status: COMPLETED | OUTPATIENT
Start: 2025-01-26 | End: 2025-01-26

## 2025-01-26 RX ORDER — ENOXAPARIN SODIUM 100 MG/ML
40 INJECTION SUBCUTANEOUS EVERY 24 HOURS
Status: DISCONTINUED | OUTPATIENT
Start: 2025-01-26 | End: 2025-01-27 | Stop reason: HOSPADM

## 2025-01-26 RX ORDER — DIPHENHYDRAMINE HYDROCHLORIDE 50 MG/ML
25 INJECTION INTRAMUSCULAR; INTRAVENOUS
Status: DISCONTINUED | OUTPATIENT
Start: 2025-01-26 | End: 2025-01-26

## 2025-01-26 RX ORDER — IBUPROFEN 200 MG
16 TABLET ORAL
Status: DISCONTINUED | OUTPATIENT
Start: 2025-01-26 | End: 2025-01-27 | Stop reason: HOSPADM

## 2025-01-26 RX ORDER — LABETALOL HCL 20 MG/4 ML
20 SYRINGE (ML) INTRAVENOUS EVERY 4 HOURS PRN
Status: DISCONTINUED | OUTPATIENT
Start: 2025-01-26 | End: 2025-01-27 | Stop reason: HOSPADM

## 2025-01-26 RX ORDER — CHLORTHALIDONE 25 MG/1
25 TABLET ORAL DAILY
Status: DISCONTINUED | OUTPATIENT
Start: 2025-01-26 | End: 2025-01-27 | Stop reason: HOSPADM

## 2025-01-26 RX ADMIN — DEXAMETHASONE SODIUM PHOSPHATE 8 MG: 4 INJECTION, SOLUTION INTRA-ARTICULAR; INTRALESIONAL; INTRAMUSCULAR; INTRAVENOUS; SOFT TISSUE at 09:01

## 2025-01-26 RX ADMIN — CHLORTHALIDONE 25 MG: 25 TABLET ORAL at 02:01

## 2025-01-26 RX ADMIN — DIPHENHYDRAMINE HYDROCHLORIDE 25 MG: 50 INJECTION, SOLUTION INTRAMUSCULAR; INTRAVENOUS at 01:01

## 2025-01-26 RX ADMIN — DEXAMETHASONE SODIUM PHOSPHATE 8 MG: 4 INJECTION, SOLUTION INTRA-ARTICULAR; INTRALESIONAL; INTRAMUSCULAR; INTRAVENOUS; SOFT TISSUE at 01:01

## 2025-01-26 RX ADMIN — DIPHENHYDRAMINE HYDROCHLORIDE 25 MG: 50 INJECTION, SOLUTION INTRAMUSCULAR; INTRAVENOUS at 09:01

## 2025-01-26 RX ADMIN — FAMOTIDINE 40 MG: 10 INJECTION, SOLUTION INTRAVENOUS at 06:01

## 2025-01-26 RX ADMIN — FAMOTIDINE 20 MG: 10 INJECTION, SOLUTION INTRAVENOUS at 08:01

## 2025-01-26 RX ADMIN — FAMOTIDINE 20 MG: 10 INJECTION, SOLUTION INTRAVENOUS at 01:01

## 2025-01-26 RX ADMIN — HYDRALAZINE HYDROCHLORIDE 50 MG: 50 TABLET ORAL at 08:01

## 2025-01-26 RX ADMIN — DEXAMETHASONE SODIUM PHOSPHATE 4 MG: 4 INJECTION, SOLUTION INTRA-ARTICULAR; INTRALESIONAL; INTRAMUSCULAR; INTRAVENOUS; SOFT TISSUE at 08:01

## 2025-01-26 RX ADMIN — METHYLPREDNISOLONE SODIUM SUCCINATE 125 MG: 125 INJECTION, POWDER, FOR SOLUTION INTRAMUSCULAR; INTRAVENOUS at 06:01

## 2025-01-26 RX ADMIN — ENOXAPARIN SODIUM 40 MG: 40 INJECTION SUBCUTANEOUS at 04:01

## 2025-01-26 RX ADMIN — FAMOTIDINE 20 MG: 10 INJECTION, SOLUTION INTRAVENOUS at 09:01

## 2025-01-26 NOTE — ED PROVIDER NOTES
Encounter Date: 1/26/2025       History     Chief Complaint   Patient presents with    Oral Swelling     Patient  woke  up  this  morning with her  tongue swollen.  Breathing  without  difficulty in triage     She presents with her 3rd episode recently and probably 4th or 5th over the last 2 or 3 years of waking up with swelling in her tongue.  See my note from last month, very similar circumstances.  Previously on an angiotensin receptor blocker, she reports that she did discontinue this medication as instructed and believes that her replacement blood pressure medicine is now carvedilol.  She does not have her bottles with her.  Symptoms now are similar to before, diffuse swelling of the tongue with a thickened sense of change in voice, no trouble breathing, no swelling elsewhere.  No skin rash or itching although she does have a prescription with her for hydroxyzine for intermittent itching, she is not having any at present.  No other complaints.  Family is trying to verify medications now.  Consulted ENT on arrival. Took Benadryl 25 mg just prior to arrival.    The history is provided by the patient and a relative. No  was used.     Review of patient's allergies indicates:  No Known Allergies  Past Medical History:   Diagnosis Date    Unspecified cataract      Past Surgical History:   Procedure Laterality Date    CATARACT EXTRACTION      TONSILLECTOMY       Family History   Problem Relation Name Age of Onset    No Known Problems Mother      No Known Problems Father       Social History     Tobacco Use    Smoking status: Never    Smokeless tobacco: Never   Substance Use Topics    Alcohol use: Never    Drug use: Never     Review of Systems   HENT:          See HPI   Respiratory:  Negative for chest tightness and shortness of breath.    Cardiovascular:  Negative for chest pain.   Skin:  Negative for rash.       Physical Exam     Initial Vitals [01/26/25 0600]   BP Pulse Resp Temp SpO2   (!)  216/67 83 18 97.9 °F (36.6 °C) 99 %      MAP       --         Physical Exam    Nursing note and vitals reviewed.  Constitutional: She appears well-developed and well-nourished. No distress.   HENT:   Head: Normocephalic and atraumatic.   Moderate, symmetric bilateral anterior and posterior tongue swelling with milder degree of involvement of the floor of the mouth, able to see posterior soft palate and oropharynx and they do not appear involved.  No swelling lips, perioral, or submental regions.  Exam typical for angioedema.  No stridor.  Airway clear.   Eyes: EOM are normal. Pupils are equal, round, and reactive to light.   Neck: Neck supple.   Normal range of motion.  Cardiovascular:  Normal rate and regular rhythm.           Pulmonary/Chest: Breath sounds normal. No respiratory distress.   Abdominal: Abdomen is soft. She exhibits no distension.   Musculoskeletal:         General: No tenderness. Normal range of motion.      Cervical back: Normal range of motion and neck supple.     Neurological: She is alert and oriented to person, place, and time. She has normal strength.   Skin: Skin is warm.         ED Course   Critical Care    Date/Time: 1/26/2025 8:29 AM    Performed by: Arnold Joiner MD  Authorized by: rAnold Joiner MD  Direct patient critical care time: 15 minutes  Additional history critical care time: 5 minutes  Ordering / reviewing critical care time: 12 minutes  Documentation critical care time: 5 minutes  Consulting other physicians critical care time: 10 minutes  Total critical care time (exclusive of procedural time) : 47 minutes  Critical care was necessary to treat or prevent imminent or life-threatening deterioration of the following conditions: respiratory failure.  Critical care was time spent personally by me on the following activities: development of treatment plan with patient or surrogate, discussions with consultants, interpretation of cardiac output  measurements, evaluation of patient's response to treatment, examination of patient, obtaining history from patient or surrogate, ordering and performing treatments and interventions, re-evaluation of patient's condition, pulse oximetry and review of old charts.        Labs Reviewed - No data to display       Imaging Results    None          Medications   hydrALAZINE tablet 50 mg (has no administration in time range)   dexAMETHasone injection 4 mg (has no administration in time range)   famotidine (PF) injection 20 mg (has no administration in time range)   methylPREDNISolone sodium succinate injection 125 mg (125 mg Intravenous Given 1/26/25 0658)   famotidine (PF) injection 40 mg (40 mg Intravenous Given 1/26/25 0658)     Medical Decision Making  Amount and/or Complexity of Data Reviewed  Discussion of management or test interpretation with external provider(s): 6:30 AM Consult: I discussed the case with Dr. Wagner (ENT). Agrees with current management.   Recommends will evalaute in ED    8:26 AM    ENT recommends [facility]osp [facility] admission for 24 hrs obs    8:26 AM Consult: I discussed the case with Dr. Long (Hosp Med). Agrees with current management.   Recommends will evaluate in ED        Risk  Prescription drug management.                                      Clinical Impression:  Final diagnoses:  [T78.3XXA] Angioedema, initial encounter (Primary)  [R22.0] Tongue swelling  [I16.0] Hypertensive urgency          ED Disposition Condition    Observation Serious                Arnold Joiner MD  01/26/25 7264

## 2025-01-26 NOTE — H&P
[facility]   History & Physical Note     Resident Team: [facility] 1  Attending Physician: Kelby Landers MD  Resident: Thor Carrasquillo MD  Intern: Alicia Carrera DO     Date of Admit: 1/26/2025    Chief Complaint:     Oral Swelling (Patient  woke  up  this  morning with her  tongue swollen.  Breathing  without  difficulty in triage)    Subjective:      History of Present Illness:  Paula Nicole is a 89 y.o. female with PMH of HTN who presented to [facility] ED on 1/26/2025  with c/o tongue swelling since 3AM this morning. Denies cp, sob, any recent viral illness, ear pain, sinus pain, cough, dysphagia. Reports she experiences intermittent hives and skin itching but unknown source. Patient denies h/o known allergies and denies any recent change in diet or household product. Does endorse having a cat around but the cat has been there for a long time now. Denies FH of angioedema and unknown if any family experiencing ACEi-induced angioedema.    Patient states this is her 3rd time experiencing similar complaints, with this current episode being 2nd time presenting to ED for, last presentation about 2 mo ago. Patient was previously on ACEi at one point which was discontinued with 1st presentation and transitioned to ARB but patient still experience angioedema symptoms despite the change. Last filled Omesartan during 08/2024 with 90 days of supply but patient endorses she threw away the medication after she continues to experience symptoms around 12/2024. BP medication was afterwards switched to Coreg 25mg bid.    In ED, patient was sating well on RA at >95%. Hypertensive >180/60. Physical exam reveals mildly edematous tongue and mild swelling of floor of mouth but airway still appears patent. No other noted skin lesions or hives noted. Other labs still pending. Given 1x IV Benadryl 25mg, 1x IV Dexamethasone 4mg, 1x Solumedrol 125mg, 1x Famotidone 20mg, 1x Famotidine 40mg and 1x Hydralazine 50mg.     IM consulted  for further management of angioedema.      Past Medical History:   has a past medical history of Unspecified cataract.     Past Surgical History:   has a past surgical history that includes Tonsillectomy and Cataract extraction.     Family History:  family history includes No Known Problems in her father and mother.     Social History:   reports that she has never smoked. She has never used smokeless tobacco. She reports that she does not drink alcohol and does not use drugs.     Allergies:  has No Known Allergies.     Home Medications:  Prior to Admission medications    Medication Sig Start Date End Date Taking? Authorizing Provider   carvediloL (COREG) 25 MG tablet Take 25 mg by mouth 2 (two) times daily. 12/17/24  Yes Provider, Historical   amLODIPine (NORVASC) 5 MG tablet Take 5 mg by mouth. 1/24/23   Provider, Historical   aspirin (ECOTRIN) 81 MG EC tablet Take 81 mg by mouth once daily.    Provider, Historical   cyanocobalamin (VITAMIN B-12) 100 MCG tablet Take 100 mcg by mouth once daily.    Provider, Historical   diphenhydramine-calamine (CALAMINE MEDICATED) 1-8 % Lotn Apply topically 2 (two) times daily as needed (itching). 11/16/24   Zo Smiley MD   docusate sodium (COLACE) 100 MG capsule Take 100 mg by mouth 2 (two) times daily.    Provider, Historical   gabapentin (NEURONTIN) 300 MG capsule Take 300 mg by mouth 2 (two) times a day.    Provider, Historical   HYDROcodone-acetaminophen (NORCO) 7.5-325 mg per tablet  2/8/23   Provider, Historical   multivitamin with minerals tablet Take 1 tablet by mouth once daily.    Provider, Historical   omega-3 fatty acids/fish oil (FISH OIL-OMEGA-3 FATTY ACIDS) 300-1,000 mg capsule Take by mouth once daily.    Provider, Historical         ROS is negative unless stated above        Objective:     Vital Signs (Most Recent):  Temp: 97.9 °F (36.6 °C) (01/26/25 0600)  Pulse: 90 (01/26/25 1031)  Resp: 16 (01/26/25 0902)  BP: (!) 186/82 (01/26/25 1031)  SpO2: 95 %  (01/26/25 1031) Vital Signs (24h Range):  Temp:  [97.9 °F (36.6 °C)] 97.9 °F (36.6 °C)  Pulse:  [47-90] 90  Resp:  [16-18] 16  SpO2:  [95 %-99 %] 95 %  BP: (173-216)/(58-94) 186/82       Physical Exam  Constitutional:       Appearance: Normal appearance.   HENT:      Head:      Comments: mildly edematous tongue and mild swelling of floor of mouth noted but airway still appears patent  Cardiovascular:      Rate and Rhythm: Normal rate and regular rhythm.      Pulses: Normal pulses.      Heart sounds: Normal heart sounds. No murmur heard.  Pulmonary:      Effort: Pulmonary effort is normal. No respiratory distress.      Breath sounds: Normal breath sounds. No wheezing, rhonchi or rales.   Abdominal:      General: Abdomen is flat. Bowel sounds are normal. There is no distension.      Palpations: Abdomen is soft.      Tenderness: There is no abdominal tenderness. There is no guarding or rebound.   Musculoskeletal:      Right lower leg: No edema.      Left lower leg: No edema.   Skin:     General: Skin is warm.      Capillary Refill: Capillary refill takes less than 2 seconds.      Findings: No erythema, lesion or rash.   Neurological:      General: No focal deficit present.      Mental Status: She is alert and oriented to person, place, and time.   Psychiatric:         Mood and Affect: Mood normal.         Behavior: Behavior normal.          Laboratory:  Most Recent Data:  Recent Lab Results       None              Radiology:  Imaging Results    None            Lines/Drains/Airways       Peripheral Intravenous Line  Duration                  Peripheral IV - Single Lumen 01/26/25 0644 20 G 1 in Anterior;Right Forearm <1 day                     Assessment & Plan:     Angioedema  H/o multiple episodes of angioedema  H/o ACEi/ARB use  H/o hives/itching from unknown source  -Per ENT, swelling limited to floor of mouth, tongue, some trace edema to lingual surface of epiglottis but overall airway is widely patent and she is  stable on room air.   -CBC, CMP, Sed rate, CRP, C4 complement, C1q complement level pending  -Given 1x IV Benadryl 25mg, 1x IV Dexamethasone 4mg, 1x Solumedrol 125mg, 1x Famotidone 20mg, 1x Famotidine 40mg in ED  -ENT consulted, recommendations as followed:   -Decadron 8 mg Q8H, pepcid, benadryl    -Consider TXA    -Will need referral for Allergy and Immunology upon discharge   -Holding home Hydroxyzine  -Pulse ox q4h  -NPO for now, ADAT to clear liquid with successful bedside swallow    HTN  -BP on admission elevated at >180/60  -Given 1x Hydralazine 50mg in ED but BP remains elevated despite that  -Initiate Chlorthalidone 25mg qd with PRN Hydralazine in place  -Avoid ACEi/ARB for blood pressure control in the setting of angioedema  -Holding home ASA      Code status: FULL CODE  DVT prophylaxis: Lovenox  Diet: NPO, ADAT  Oxygenation: RA  GI prophylaxis: Pepcid  Lines/drains: IVP  Consults: ENT    Disposition: Patient is being admitted for close monitoring of angioedema and further workup. ENT consulted. Can likely discharge once medically stable.    Alicia Carrera DO  [employer] [facility] PGY-1

## 2025-01-27 VITALS
HEIGHT: 63 IN | SYSTOLIC BLOOD PRESSURE: 137 MMHG | HEART RATE: 81 BPM | OXYGEN SATURATION: 97 % | RESPIRATION RATE: 18 BRPM | TEMPERATURE: 98 F | DIASTOLIC BLOOD PRESSURE: 77 MMHG | BODY MASS INDEX: 25.87 KG/M2 | WEIGHT: 146 LBS

## 2025-01-27 PROBLEM — T78.3XXA ANGIOEDEMA: Status: ACTIVE | Noted: 2025-01-27

## 2025-01-27 PROBLEM — I16.0 HYPERTENSIVE URGENCY: Status: ACTIVE | Noted: 2025-01-27

## 2025-01-27 PROBLEM — I16.0 HYPERTENSIVE URGENCY: Status: RESOLVED | Noted: 2025-01-27 | Resolved: 2025-01-27

## 2025-01-27 PROBLEM — R22.0 TONGUE SWELLING: Status: ACTIVE | Noted: 2025-01-27

## 2025-01-27 PROBLEM — R22.0 TONGUE SWELLING: Status: RESOLVED | Noted: 2025-01-27 | Resolved: 2025-01-27

## 2025-01-27 LAB
ALBUMIN SERPL-MCNC: 3.5 G/DL (ref 3.4–4.8)
ALBUMIN/GLOB SERPL: 0.9 RATIO (ref 1.1–2)
ALP SERPL-CCNC: 55 UNIT/L (ref 40–150)
ALT SERPL-CCNC: 9 UNIT/L (ref 0–55)
ANION GAP SERPL CALC-SCNC: 8 MEQ/L
AST SERPL-CCNC: 16 UNIT/L (ref 5–34)
BASOPHILS # BLD AUTO: 0.01 X10(3)/MCL
BASOPHILS NFR BLD AUTO: 0.2 %
BILIRUB SERPL-MCNC: 0.4 MG/DL
BUN SERPL-MCNC: 14.7 MG/DL (ref 9.8–20.1)
CALCIUM SERPL-MCNC: 9.8 MG/DL (ref 8.4–10.2)
CHLORIDE SERPL-SCNC: 106 MMOL/L (ref 98–111)
CO2 SERPL-SCNC: 24 MMOL/L (ref 23–31)
CREAT SERPL-MCNC: 0.71 MG/DL (ref 0.55–1.02)
CREAT/UREA NIT SERPL: 21
EOSINOPHIL # BLD AUTO: 0 X10(3)/MCL (ref 0–0.9)
EOSINOPHIL NFR BLD AUTO: 0 %
ERYTHROCYTE [DISTWIDTH] IN BLOOD BY AUTOMATED COUNT: 14.1 % (ref 11.5–17)
GFR SERPLBLD CREATININE-BSD FMLA CKD-EPI: >60 ML/MIN/1.73/M2
GLOBULIN SER-MCNC: 3.8 GM/DL (ref 2.4–3.5)
GLUCOSE SERPL-MCNC: 148 MG/DL (ref 75–121)
HCT VFR BLD AUTO: 37.9 % (ref 37–47)
HGB BLD-MCNC: 12 G/DL (ref 12–16)
IMM GRANULOCYTES # BLD AUTO: 0.02 X10(3)/MCL (ref 0–0.04)
IMM GRANULOCYTES NFR BLD AUTO: 0.3 %
LYMPHOCYTES # BLD AUTO: 1.31 X10(3)/MCL (ref 0.6–4.6)
LYMPHOCYTES NFR BLD AUTO: 20.8 %
MAGNESIUM SERPL-MCNC: 2 MG/DL (ref 1.6–2.6)
MCH RBC QN AUTO: 26.6 PG (ref 27–31)
MCHC RBC AUTO-ENTMCNC: 31.7 G/DL (ref 33–36)
MCV RBC AUTO: 84 FL (ref 80–94)
MONOCYTES # BLD AUTO: 0.17 X10(3)/MCL (ref 0.1–1.3)
MONOCYTES NFR BLD AUTO: 2.7 %
NEUTROPHILS # BLD AUTO: 4.79 X10(3)/MCL (ref 2.1–9.2)
NEUTROPHILS NFR BLD AUTO: 76 %
NRBC BLD AUTO-RTO: 0 %
PHOSPHATE SERPL-MCNC: 3.3 MG/DL (ref 2.3–4.7)
PLATELET # BLD AUTO: 248 X10(3)/MCL (ref 130–400)
PMV BLD AUTO: 11.5 FL (ref 7.4–10.4)
POTASSIUM SERPL-SCNC: 3.8 MMOL/L (ref 3.5–5.1)
PROT SERPL-MCNC: 7.3 GM/DL (ref 5.8–7.6)
RBC # BLD AUTO: 4.51 X10(6)/MCL (ref 4.2–5.4)
SODIUM SERPL-SCNC: 138 MMOL/L (ref 136–145)
WBC # BLD AUTO: 6.3 X10(3)/MCL (ref 4.5–11.5)

## 2025-01-27 PROCEDURE — 85025 COMPLETE CBC W/AUTO DIFF WBC: CPT

## 2025-01-27 PROCEDURE — G0378 HOSPITAL OBSERVATION PER HR: HCPCS

## 2025-01-27 PROCEDURE — 83735 ASSAY OF MAGNESIUM: CPT

## 2025-01-27 PROCEDURE — 36415 COLL VENOUS BLD VENIPUNCTURE: CPT

## 2025-01-27 PROCEDURE — 94761 N-INVAS EAR/PLS OXIMETRY MLT: CPT

## 2025-01-27 PROCEDURE — 96376 TX/PRO/DX INJ SAME DRUG ADON: CPT

## 2025-01-27 PROCEDURE — 84100 ASSAY OF PHOSPHORUS: CPT

## 2025-01-27 PROCEDURE — 25000003 PHARM REV CODE 250

## 2025-01-27 PROCEDURE — 63600175 PHARM REV CODE 636 W HCPCS

## 2025-01-27 PROCEDURE — 80053 COMPREHEN METABOLIC PANEL: CPT

## 2025-01-27 RX ORDER — ACETAMINOPHEN 500 MG
1 TABLET ORAL DAILY
Qty: 1 EACH | Refills: 0 | Status: SHIPPED | OUTPATIENT
Start: 2025-01-27 | End: 2026-01-27

## 2025-01-27 RX ORDER — CHLORTHALIDONE 25 MG/1
25 TABLET ORAL DAILY
Qty: 30 TABLET | Refills: 2 | Status: SHIPPED | OUTPATIENT
Start: 2025-01-28 | End: 2025-04-28

## 2025-01-27 RX ADMIN — DIPHENHYDRAMINE HYDROCHLORIDE 25 MG: 50 INJECTION, SOLUTION INTRAMUSCULAR; INTRAVENOUS at 05:01

## 2025-01-27 RX ADMIN — FAMOTIDINE 20 MG: 10 INJECTION, SOLUTION INTRAVENOUS at 08:01

## 2025-01-27 RX ADMIN — DEXAMETHASONE SODIUM PHOSPHATE 8 MG: 4 INJECTION, SOLUTION INTRA-ARTICULAR; INTRALESIONAL; INTRAMUSCULAR; INTRAVENOUS; SOFT TISSUE at 05:01

## 2025-01-27 RX ADMIN — CHLORTHALIDONE 25 MG: 25 TABLET ORAL at 08:01

## 2025-01-27 NOTE — PROGRESS NOTES
OTOLARYNGOLOGY PROGRESS NOTE    SERVICE: [facility] - Otolaryngology  RESIDENT PHYSICIANS: Huma Celestin, PGY3  ATTENDING PHYSICIAN: Fernando Nielsen    REASON FOR CONSULT: angiodema  CHIEF COMPLAINT:   Chief Complaint   Patient presents with    Oral Swelling     Patient  woke  up  this  morning with her  tongue swollen.  Breathing  without  difficulty in triage         HISTORY OF PRESENT ILLNESS  Paula Nicole is a 89 y.o. female with past medical history of hypertension who presents with tongue and floor of mouth swelling.  Patient reports she noticed her voice sounded a little different yesterday.  She woke up in the middle of the night and noticed her tongue was quite swollen and came to the ED.  She denies any dyspnea, stridor, dysphagia. Of note, this is the patient's 3rd presentation in the last 2 months for angioedema symptoms.  On her 1st presentation, she was on an ACE inhibitor which was then discontinued.  However, it appears she was then put on an ARB and presented again with angioedema type symptoms.  Per patient and her family, those medicines have been discontinued.  However, on review of her medication history, an ARB was recently prescribed on the 14th of January.      INTERVAL  Afebrile and hypertensive. No acute events overnight. Patient denies pain, oral swelling, dysphonia, or dyspnea this morning. Had not attempted to eat breakfast yet on my exam.      REVIEW OF SYSTEMS:  Negative except as stated in HPI.    As above and otherwise negative X 10-point review.    PAST MEDICAL HISTORY  Past Medical History:   Diagnosis Date    Unspecified cataract        PAST SURGICAL HISTORY  Past Surgical History:   Procedure Laterality Date    CATARACT EXTRACTION      TONSILLECTOMY         SOCIAL HISTORY  Social History     Socioeconomic History    Marital status:    Tobacco Use    Smoking status: Never    Smokeless tobacco: Never   Substance and Sexual Activity    Alcohol  use: Never    Drug use: Never    Sexual activity: Never     Social Drivers of Health     Financial Resource Strain: Low Risk  (1/26/2025)    Overall Financial Resource Strain (CARDIA)     Difficulty of Paying Living Expenses: Not very hard   Food Insecurity: No Food Insecurity (1/26/2025)    Hunger Vital Sign     Worried About Running Out of Food in the Last Year: Never true     Ran Out of Food in the Last Year: Never true   Transportation Needs: No Transportation Needs (1/26/2025)    TRANSPORTATION NEEDS     Transportation : No   Stress: No Stress Concern Present (1/26/2025)    [employer] - Occupational Stress Questionnaire     Feeling of Stress : Not at all   Housing Stability: Low Risk  (1/26/2025)    Housing Stability Vital Sign     Unable to Pay for Housing in the Last Year: No     Homeless in the Last Year: No       ALLERGIES  Review of patient's allergies indicates:  No Known Allergies    FAMILY HISTORY  Family History   Problem Relation Name Age of Onset    No Known Problems Mother      No Known Problems Father         Physical Exam  GENERAL: NAD, AAO, no dyspnea/inc WOB, no stridor, tolerates secretions   NEURO: CN II - XII exam: intact bilaterally   EYES: EOMI, PERRLA  EARS: Hearing well at normal conversation volume   AD: EAC clear, TM intact   AS: EAC clear, TM intact  NOSE: nares normal, septum midline, no rhinorrhea or bleeding  ORAL CAVITY: MMM, no lesions or ulcerations  Mild tongue and floor of mouth edema but soft  OROPHARYNX: No uvular deviation or deviation of the oropharyngeal wall noted, no erythema/ petechia/ clots; No effacement of the palatopharyngeal and/or palatoglossal folds  VOICE: communicates effectively via voicing which is slightly muffled  NECK: no asymmetry, masses, or scars, no adenopathy  CARDIOVASCULAR: peripheral pulses palpable  RESPIRATORY: non-labored respirations with symmetric chest rise, no stridor  EXT: moving with coordination  PSYCHIATRIC:  orientation to time/place/person, no depression, no anxiety or agitation, mood and affect wnl      LABORATORY RESULTS  Lab Results   Component Value Date/Time    WBC 6.30 01/27/2025 03:46 AM    HGB 12.0 01/27/2025 03:46 AM    HCT 37.9 01/27/2025 03:46 AM    CO2 24 01/27/2025 03:46 AM    BUN 14.7 01/27/2025 03:46 AM    CREATININE 0.71 01/27/2025 03:46 AM    GLUCOSE 148 (H) 01/27/2025 03:46 AM    CALCIUM 9.8 01/27/2025 03:46 AM    ALBUMIN 3.5 01/27/2025 03:46 AM    AST 16 01/27/2025 03:46 AM    ALT 9 01/27/2025 03:46 AM    ALKPHOS 55 01/27/2025 03:46 AM       Assessment and Plan:  Paula Nicole is a 89 y.o. female with history of HTN who presents with third episode of angioedema. Swelling limited to floor of mouth, tongue, some trace edema to lingual surface of epiglottis but overall airway is widely patent and she is stable on room air. Improved overnight.     - Decadron 8 mg Q8H, pepcid, benadryl  - Consider TXA  - From ENT standpoint, ok for discharge if continues to have no airway complaints this morning and can tolerate a diet without dysphagia/globus sensation  - Will need referral for Allergy and Immunology upon discharge  - Please call ENT with questions/concerns      Huma Celestin, PGY3  [facility]  1/27/2025 10:56 AM

## 2025-01-27 NOTE — CONSULTS
Spoke with pt, agrees with home health. No preference indicated, referral submitted to [facility] via [facility].  [facility] unable to accept, out of network with pt's insurance.  Referral submitted & accepted by [facility] via [facility].

## 2025-01-27 NOTE — MEDICAL/APP STUDENT
[facility]  PROGRESS NOTE    Patient's Name: Paula Nicole  : 1930  MRN: [id number]    Admission Date: 2025  Length of Stay: 0  Attending Physician: Kelby Landers MD  Resident: Thor Carrasquillo  Intern: Alicia Carrera    SUBJECTIVE     Chief Complaint   Patient presents with    Oral Swelling     Patient  woke  up  this  morning with her  tongue swollen.  Breathing  without  difficulty in triage     History of Present Illness:  Paula Nicole is a 89 y.o. female with PMH of HTN who presented to [facility] ED on 2025  with c/o tongue swelling since 3AM this morning. Denies cp, sob, any recent viral illness, ear pain, sinus pain, cough, dysphagia. Reports she experiences intermittent hives and skin itching but unknown source. Patient denies h/o known allergies and denies any recent change in diet or household product. Does endorse having a cat around but the cat has been there for a long time now. Denies FH of angioedema and unknown if any family experiencing ACEi-induced angioedema.     Patient states this is her 3rd time experiencing similar complaints, with this current episode being 2nd time presenting to ED for, last presentation about 2 mo ago. Patient was previously on ACEi at one point which was discontinued with 1st presentation and transitioned to ARB but patient still experience angioedema symptoms despite the change. Last filled Omesartan during 2024 with 90 days of supply but patient endorses she threw away the medication after she continues to experience symptoms around 2024. BP medication was afterwards switched to Coreg 25mg bid. Today, the patient reported she never got the Coreg and continued to take Omesartan up to her current presentation.     In ED, patient was sating well on RA at >95%. Hypertensive >180/60. Physical exam reveals mildly edematous tongue and mild swelling of floor of mouth. ENT confirmed airway still patent. No other noted skin  lesions or hives noted. Other labs still pending. Given 1x IV Benadryl 25mg, 1x IV Dexamethasone 4mg, 1x Solumedrol 125mg, 1x Famotidone 20mg, 1x Famotidine 40mg and 1x Hydralazine 50mg.      IM consulted for further management of angioedema.    Interval History:  NAEON. Blood pressure improved. Other vital signs stable, afebrile. Swelling of the tongue largely resolved. Denies dyspnea, dysphagia, chest pain, stridor. No problems drinking.     Review of Systems:  A 12-pt ROS was conducted and was negative unless stated above.    OBJECTIVE     VITAL SIGNS: 24 HR MIN & MAX Most Recent Vitals   Temp  Min: 97.5 °F (36.4 °C)  Max: 99 °F (37.2 °C)  98.2 °F (36.8 °C)   BP  Min: 137/77  Max: 180/72  137/77    Pulse  Min: 50  Max: 82  81   Resp  Min: 17  Max: 18  18    SpO2  Min: 95 %  Max: 99 %  97 %    Body mass index is 25.86 kg/m².    Intake/Output:  I/O last 3 completed shifts:  In: 270 [P.O.:270]  Out: 2 [Urine:2]  Physical Exam  Constitutional:       General: She is not in acute distress.  HENT:      Mouth/Throat:      Mouth: Mucous membranes are dry.      Comments: Largely resolved swelling of the tongue and soft palate  Pulmonary:      Effort: No respiratory distress.      Breath sounds: No stridor.   Skin:     General: Skin is warm and dry.   Neurological:      Mental Status: She is alert and oriented to person, place, and time.   Psychiatric:         Mood and Affect: Mood normal.         Behavior: Behavior normal.       Current Medications:  Scheduled:   chlorthalidone  25 mg Oral Daily    dexAMETHasone injection  8 mg Intravenous Q8H    diphenhydrAMINE  25 mg Intravenous Q8H    enoxparin  40 mg Subcutaneous Daily    famotidine (PF)  20 mg Intravenous BID      Infusions:    PRNs:    Current Facility-Administered Medications:     dextrose 50%, 12.5 g, Intravenous, PRN    dextrose 50%, 25 g, Intravenous, PRN    glucagon (human recombinant), 1 mg, Intramuscular, PRN    glucose, 16 g, Oral, PRN    glucose, 24 g, Oral,  "PRN    hydrALAZINE, 20 mg, Intravenous, Q4H PRN    labetalol, 20 mg, Intravenous, Q4H PRN    naloxone, 0.02 mg, Intravenous, PRN    sodium chloride 0.9%, 10 mL, Intravenous, Q12H PRN  Labs:  CBC:  Recent Labs   Lab 01/26/25  1101 01/27/25  0346   WBC 6.92 6.30   HGB 13.1 12.0   HCT 42.0 37.9    248   MCV 86.4 84.0   RDW 14.3 14.1     BMP/CMP:  Recent Labs   Lab 01/26/25  1101 01/27/25  0346    138   K 3.8 3.8    106   CO2 23 24   BUN 10.8 14.7   CREATININE 0.68 0.71   GLUCOSE 138* 148*   EGFRNORACEVR >60 >60     RFTs/LFTs:  Recent Labs   Lab 01/26/25  1101 01/27/25  0346   CALCIUM 9.6 9.8   LABPROT 8.0* 7.3   ALBUMIN 3.8 3.5   AST 19 16   ALT 10 9   ALKPHOS 62 55   BILITOT 0.5 0.4     Recent Labs   Lab 01/26/25  1101 01/27/25  0346   MG 2.10 2.00   PHOS 2.4 3.3     Cardiac Panel:  No results for input(s): "TROPONINI", "CKTOTAL", "CKMB", "BNP" in the last 168 hours.  Interval Imaging:  X-ray Shoulder 2 or More Views Right  See Provider Notes for results.     IMPRESSION: Please see provider office/clinic notes for radiology   interpretation    This procedure was auto-finalized by: Virtual Radiologist     Microbiology:  Microbiology Results (last 7 days)       ** No results found for the last 168 hours. **          Antibiotics:  Antibiotics (From admission, onward)      None             ASSESSMENT AND PLAN   Angioedema  H/o multiple episodes of angioedema  H/o ACEi/ARB use  H/o hives/itching from unknown source  - Per ENT, swelling limited to floor of mouth, tongue, some trace edema to lingual surface of epiglottis but overall airway is widely patent and she is stable on room air.   - CBC, CMP normal, Sed rate 35, CRP normal, C4 complement normal, C1q complement level and IgE pending   - ENT consulted. From ENT standpoint, ok for discharge if continues to have no airway complaints this morning and can tolerate a diet without dysphagia/globus sensation. Other recommendations as followed:              " -Decadron 8 mg Q8H, pepcid, benadryl  - Consider TXA  - Will need referral for Allergy and Immunology upon discharge  - Please call ENT with questions/concerns  - Advance diet as tolerated    Hypertension  - BP >180/60 on admission. Persistent despite 50 mg Hydralazine   - Continue Chlorthalidone 25mg qd with PRN Hydralazine. Consider titrating up chlorthalidone if needed.   - Avoid ACEi/ARB for blood pressure control in the setting of angioedema   - Follow up w PCP outpatient    DVT Prophylaxis: Lovenox  GI Prophylaxis: Pepcid  Abx: None  Access: PIV  Fluids: None  Diet: Diet Adult Regular Standard Tray    Code Status: Full Code    Disposition: Discharge home pending clearance from ENT       @sbsign@

## 2025-01-27 NOTE — DISCHARGE SUMMARY
[facility] Internal Medicine   DISCHARGE SUMMARY    Patient's Name: Paula Nicole  : 1930  MRN: [id number]  Code Status: Full Code    Admitting Physician: Donita Lopez MD  Attending Physician: Kelby Landers MD  Primary Care Provider: Jami, Primary Doctor  Date of Admission: 2025  Date of Discharge: 2025    Condition: Stable  Outcome: Condition has improved and patient is now ready for discharge.  Disposition: Home or Self Care    Discharge Diagnoses   There is no problem list on file for this patient.      Principal Problem:  <principal problem not specified>      Consultants and Procedures   Consultants:  IP CONSULT TO ENT  IP CONSULT TO HOSPITAL MEDICINE    Procedures:   * No surgery found *       Brief Admission History   Paula Nicole is a 89 y.o. female with PMH of HTN who presented to [facility] ED on 2025  with c/o tongue swelling since 3AM this morning. Denies cp, sob, any recent viral illness, ear pain, sinus pain, cough, dysphagia. Reports she experiences intermittent hives and skin itching but unknown source. Patient denies h/o known allergies and denies any recent change in diet or household product. Does endorse having a cat around but the cat has been there for a long time now. Denies FH of angioedema and unknown if any family experiencing ACEi-induced angioedema.     Patient states this is her 3rd time experiencing similar complaints, with this current episode being 2nd time presenting to ED for, last presentation about 2 mo ago. Patient was previously on ACEi at one point which was discontinued with 1st presentation and transitioned to ARB but patient still experience angioedema symptoms despite the change. Last filled Omesartan during 2024 with 90 days of supply but patient endorses she threw away the medication after she continues to experience symptoms around 2024. BP medication was afterwards switched to Coreg 25mg bid.     In ED, patient was  "sating well on RA at >95%. Hypertensive >180/60. Physical exam reveals mildly edematous tongue and mild swelling of floor of mouth but airway still appears patent. No other noted skin lesions or hives noted. Other labs still pending. Given 1x IV Benadryl 25mg, 1x IV Dexamethasone 4mg, 1x Solumedrol 125mg, 1x Famotidone 20mg, 1x Famotidine 40mg and 1x Hydralazine 50mg.      IM consulted for further management of angioedema.      Hospital Course with Pertinent Findings     ENT consulted, swelling limited to floor of mouth, tongue, some trace edema to lingual surface of epiglottis but overall airway is widely patent and she is stable on room air. Recommend admit to inpatient for observation. Initiate on IV Decadron 8mg q8h, IV Benadryl 25mg q8h and IV Famotidine 40mg. Sed rate mildly elevated at 35. CRP negative. C4 complement wnl. C1q/C1 esterase/IgE level still pending. Given possible angioedema induced by ACEi and ARB, patient was initiated on Chlorthalidone 25mg qd for BP control with BP stable this morning. Patient continues to have no airway complaints this morning, tolerating diet without dysphagia/globus sensation. Patient is medically stable for discharge this morning with Chlorthalidone 25mg qd. Patient is also referred to AI for further work-up and advised to continue to follow-up with her PCP Dr. Martinez for BP control and antihypertensive regimen.    Discharge Physical Exam:  Vitals  BP: (!) 176/64  Temp: 97.7 °F (36.5 °C)  Temp Source: Oral  Pulse: (!) 50  Resp: 18  SpO2: 97 %  Height: 5' 3" (160 cm)  Weight: 66.2 kg (146 lb)    Physical Exam  Constitutional:       Appearance: Normal appearance.   HENT:      Mouth/Throat:      Comments: Significant improvement of tongue swelling  Airway remains patent  Cardiovascular:      Rate and Rhythm: Normal rate and regular rhythm.      Pulses: Normal pulses.      Heart sounds: Normal heart sounds. No murmur heard.  Pulmonary:      Effort: Pulmonary effort is normal. " No respiratory distress.      Breath sounds: Normal breath sounds. No wheezing, rhonchi or rales.   Abdominal:      General: Abdomen is flat. Bowel sounds are normal. There is no distension.      Palpations: Abdomen is soft.      Tenderness: There is no abdominal tenderness. There is no guarding or rebound.   Musculoskeletal:      Right lower leg: No edema.      Left lower leg: No edema.   Skin:     General: Skin is warm.      Capillary Refill: Capillary refill takes less than 2 seconds.   Neurological:      General: No focal deficit present.      Mental Status: She is alert and oriented to person, place, and time.   Psychiatric:         Mood and Affect: Mood normal.         Behavior: Behavior normal.            Discharge Medications        Medication List        ASK your doctor about these medications      amLODIPine 5 MG tablet  Commonly known as: NORVASC     aspirin 81 MG EC tablet  Commonly known as: ECOTRIN     CALAMINE MEDICATED 1-8 % Lotn  Generic drug: diphenhydramine-calamine  Apply topically 2 (two) times daily as needed (itching).     carvediloL 25 MG tablet  Commonly known as: COREG     cyanocobalamin 100 MCG tablet  Commonly known as: VITAMIN B-12     docusate sodium 100 MG capsule  Commonly known as: COLACE     fish oil-omega-3 fatty acids 300-1,000 mg capsule     gabapentin 300 MG capsule  Commonly known as: NEURONTIN     HYDROcodone-acetaminophen 7.5-325 mg per tablet  Commonly known as: NORCO     multivitamin with minerals tablet                Discharge Information   Diet:  As tolerated    Physical Activity:  As tolerated    Counseling Provided to Patient and Family:  New BP medication  Follow-up with AI  Follow-up with PCP Dr. Martinez    Instructions:  1. Take all medications as prescribed  2. Keep all follow-up appointments  3. Return to the hospital or call your primary care physician if any worsening symptoms occur.  4. The above information was discussed with the patient in clear terms. she was  able to repeat the instructions to me in her own words. All questions answered.  Patient verbalized understanding. ED precautions provided.    Follow-Up Appointments:   Follow-up Information       Demetrice Cantrell MD. Schedule an appointment as soon as possible for a visit.    Specialties: Allergy and Immunology, Allergy, Immunology  Contact information:  Bernardino Olson  [facility] [address]  [facility] [address]  [phone]                              Future Appointments:  No future appointments.    Follow up items to address with PCP and pending results at time of discharge:  BP control/Anti-hypertensive medication in the setting of angioedema    TIME SPENT ON DISCHARGE: 60 minutes      Case discussed with Dr. Landers. Please appreciate attending's attestation to follow.    Alicia Carrera DO  PGY-1 Resident  [facility] Internal Medicine Team 1  01/27/2025

## 2025-01-27 NOTE — MEDICAL/APP STUDENT
[facility]  [facility] Internal Medicine   DISCHARGE SUMMARY    Patient's Name: Paula Nicole  : 1930  MRN: [id number]  Code Status: Full Code    Admitting Physician: Donita Lopez MD  Attending Physician: Kelby Landers MD  Primary Care Provider: Jami, Primary Doctor  Date of Admission: 2025  Date of Discharge: 2025    Condition: Good  Outcome: Condition has improved and patient is now ready for discharge.  Disposition: Home or Self Care    Discharge Diagnoses   There is no problem list on file for this patient.      Principal Problem:  <principal problem not specified> Angioedema of the tongue       Consultants and Procedures   Consultants:  IP CONSULT TO ENT  IP CONSULT TO HOSPITAL MEDICINE    Procedures:   * No surgery found *       Brief Admission History   Paula Nicole is a 89 y.o. female with PMH of HTN and intermittent hives presented to [facility] ED on 2025 with tongue swelling since early that morning and hypertensive urgency w/o chest pain, dyspnea, dysphagia, stridor.     She reports this is her 3rd time experiencing similar complaints, with this current episode being 2nd time presenting to ED for, last presentation about 2 mo ago. Patient was previously on ACEi at one point which was discontinued with 1st presentation and transitioned to ARB but patient still experience angioedema symptoms despite the change. BP medication was afterwards switched to Coreg 25mg bid but she never got it and continued taking Omesartan.      In ED, patient was hypertensive >180/60 and sating well on RA. CMP and CBC were unremarkable. C4 normal. Hereditary angioedema workup pending. Flexible fiber laryngoscopy showed patent airways. Given 1x IV Benadryl 25mg, 1x IV Dexamethasone 4mg, 1x Solumedrol 125mg, 1x Famotidone 20mg, 1x Famotidine 40mg and 1x Hydralazine 50mg.     IM consulted for further management of angioedema. She was admitted for observation.     Hospital Course with Pertinent  "Findings   On admission, she was started on chlorthalidone 25 mg, which decreased her systolic BP to <180. Angioedema improved overnight.     Discharge Physical Exam:  Vitals  BP: (!) 176/64  Temp: 97.7 °F (36.5 °C)  Temp Source: Oral  Pulse: (!) 50  Resp: 18  SpO2: 97 %  Height: 5' 3" (160 cm)  Weight: 66.2 kg (146 lb)  Physical Exam  Constitutional:       General: She is not in acute distress.  HENT:      Mouth/Throat:      Mouth: Mucous membranes are dry.      Pharynx: Oropharynx is clear.      Comments: Largely resolved angioedema of the tongue  Cardiovascular:      Rate and Rhythm: Normal rate and regular rhythm.   Pulmonary:      Effort: Pulmonary effort is normal.      Breath sounds: Normal breath sounds.   Skin:     General: Skin is warm and dry.   Neurological:      Mental Status: She is alert and oriented to person, place, and time.   Psychiatric:         Mood and Affect: Mood normal.         Behavior: Behavior normal.          Discharge Medications        Medication List        ASK your doctor about these medications      amLODIPine 5 MG tablet  Commonly known as: NORVASC     aspirin 81 MG EC tablet  Commonly known as: ECOTRIN     CALAMINE MEDICATED 1-8 % Lotn  Generic drug: diphenhydramine-calamine  Apply topically 2 (two) times daily as needed (itching).     carvediloL 25 MG tablet  Commonly known as: COREG     cyanocobalamin 100 MCG tablet  Commonly known as: VITAMIN B-12     docusate sodium 100 MG capsule  Commonly known as: COLACE     fish oil-omega-3 fatty acids 300-1,000 mg capsule     gabapentin 300 MG capsule  Commonly known as: NEURONTIN     HYDROcodone-acetaminophen 7.5-325 mg per tablet  Commonly known as: NORCO     multivitamin with minerals tablet                Discharge Information   Diet:  As tolerated    Physical Activity:  As tolerated    Instructions:  1. Take all medications as prescribed.  2. Keep all follow-up appointments.  3. Return to the hospital or call your primary care " physician if any worsening symptoms occur, such as return of angioedema, shortness of breath, voice changes.  4. The above information was discussed with the patient in clear terms. she was able to repeat the instructions to me in her own words. All questions answered.  Patient verbalized understanding. ED precautions provided.    Follow-Up Appointments:  - Allergy & Immunology    Future Appointments:  No future appointments.    Follow up items to address with PCP and pending results at time of discharge:  - BP control    TIME SPENT ON DISCHARGE: 60 minutes      @sbsign@  01/27/2025

## 2025-01-27 NOTE — PLAN OF CARE
01/27/25 1200   Medicare Message   Important Message from Medicare regarding Discharge Appeal Rights Given to patient/caregiver;Explained to patient/caregiver;Signed/date by patient/caregiver   Date IMM was signed 01/27/25   Time IMM was signed 1200

## 2025-01-29 ENCOUNTER — PATIENT OUTREACH (OUTPATIENT)
Dept: ADMINISTRATIVE | Facility: CLINIC | Age: OVER 89
End: 2025-01-29
Payer: MEDICARE

## 2025-01-29 NOTE — PROGRESS NOTES
C3 nurse spoke with Paula Nicole  for a TCC post hospital discharge follow up call. The patient has a scheduled [facility] appointment with Dr. Musa  yesterday, 01/28/2025. Stated she was referred to an Dr. Rubi (Allergy) and has not received a call from Dr. Demetrice Cantrell's office.

## 2025-01-30 LAB
C1INH ACT/NOR SERPL: 86 %
C1Q SERPL-MCNC: 32 MG/DL (ref 12–22)

## 2025-01-31 LAB — PHADIOTOP IGE QN: 298 KU/L

## 2025-03-14 ENCOUNTER — EXTERNAL HOME HEALTH (OUTPATIENT)
Dept: HOME HEALTH SERVICES | Facility: HOSPITAL | Age: OVER 89
End: 2025-03-14
Payer: MEDICARE